# Patient Record
Sex: FEMALE | Race: BLACK OR AFRICAN AMERICAN | NOT HISPANIC OR LATINO | Employment: FULL TIME | URBAN - METROPOLITAN AREA
[De-identification: names, ages, dates, MRNs, and addresses within clinical notes are randomized per-mention and may not be internally consistent; named-entity substitution may affect disease eponyms.]

---

## 2024-01-30 ENCOUNTER — OFFICE VISIT (OUTPATIENT)
Dept: PRIMARY CARE | Facility: CLINIC | Age: 44
End: 2024-01-30
Payer: COMMERCIAL

## 2024-01-30 VITALS
WEIGHT: 143 LBS | SYSTOLIC BLOOD PRESSURE: 125 MMHG | HEIGHT: 63 IN | TEMPERATURE: 98 F | DIASTOLIC BLOOD PRESSURE: 85 MMHG | OXYGEN SATURATION: 98 % | BODY MASS INDEX: 25.34 KG/M2 | RESPIRATION RATE: 18 BRPM | HEART RATE: 65 BPM

## 2024-01-30 DIAGNOSIS — Z11.4 SCREENING FOR HIV (HUMAN IMMUNODEFICIENCY VIRUS): ICD-10-CM

## 2024-01-30 DIAGNOSIS — Z83.49 FAMILY HISTORY OF THYROID DISEASE: ICD-10-CM

## 2024-01-30 DIAGNOSIS — Z11.59 NEED FOR HEPATITIS C SCREENING TEST: ICD-10-CM

## 2024-01-30 DIAGNOSIS — Z00.00 HEALTHCARE MAINTENANCE: Primary | ICD-10-CM

## 2024-01-30 PROCEDURE — 87389 HIV-1 AG W/HIV-1&-2 AB AG IA: CPT

## 2024-01-30 PROCEDURE — 83036 HEMOGLOBIN GLYCOSYLATED A1C: CPT

## 2024-01-30 PROCEDURE — 80053 COMPREHEN METABOLIC PANEL: CPT

## 2024-01-30 PROCEDURE — 99396 PREV VISIT EST AGE 40-64: CPT | Performed by: INTERNAL MEDICINE

## 2024-01-30 PROCEDURE — 84443 ASSAY THYROID STIM HORMONE: CPT

## 2024-01-30 PROCEDURE — 86803 HEPATITIS C AB TEST: CPT

## 2024-01-30 PROCEDURE — 36415 COLL VENOUS BLD VENIPUNCTURE: CPT

## 2024-01-30 PROCEDURE — 1036F TOBACCO NON-USER: CPT | Performed by: INTERNAL MEDICINE

## 2024-01-30 SDOH — HEALTH STABILITY: PHYSICAL HEALTH: ON AVERAGE, HOW MANY DAYS PER WEEK DO YOU ENGAGE IN MODERATE TO STRENUOUS EXERCISE (LIKE A BRISK WALK)?: 5 DAYS

## 2024-01-30 SDOH — HEALTH STABILITY: PHYSICAL HEALTH: ON AVERAGE, HOW MANY MINUTES DO YOU ENGAGE IN EXERCISE AT THIS LEVEL?: 50 MIN

## 2024-01-30 SDOH — ECONOMIC STABILITY: TRANSPORTATION INSECURITY
IN THE PAST 12 MONTHS, HAS LACK OF TRANSPORTATION KEPT YOU FROM MEETINGS, WORK, OR FROM GETTING THINGS NEEDED FOR DAILY LIVING?: NO

## 2024-01-30 SDOH — ECONOMIC STABILITY: GENERAL
WHICH OF THE FOLLOWING DO YOU KNOW HOW TO USE AND HAVE ACCESS TO EVERY DAY? (CHOOSE ALL THAT APPLY): DESKTOP COMPUTER, LAPTOP COMPUTER, OR TABLET WITH BROADBAND INTERNET CONNECTION;SMARTPHONE WITH CELLULAR DATA PLAN

## 2024-01-30 SDOH — ECONOMIC STABILITY: FOOD INSECURITY: WITHIN THE PAST 12 MONTHS, THE FOOD YOU BOUGHT JUST DIDN'T LAST AND YOU DIDN'T HAVE MONEY TO GET MORE.: NEVER TRUE

## 2024-01-30 SDOH — ECONOMIC STABILITY: HOUSING INSECURITY
IN THE LAST 12 MONTHS, WAS THERE A TIME WHEN YOU DID NOT HAVE A STEADY PLACE TO SLEEP OR SLEPT IN A SHELTER (INCLUDING NOW)?: NO

## 2024-01-30 SDOH — ECONOMIC STABILITY: INCOME INSECURITY: IN THE LAST 12 MONTHS, WAS THERE A TIME WHEN YOU WERE NOT ABLE TO PAY THE MORTGAGE OR RENT ON TIME?: NO

## 2024-01-30 SDOH — ECONOMIC STABILITY: FOOD INSECURITY: WITHIN THE PAST 12 MONTHS, YOU WORRIED THAT YOUR FOOD WOULD RUN OUT BEFORE YOU GOT MONEY TO BUY MORE.: NEVER TRUE

## 2024-01-30 SDOH — ECONOMIC STABILITY: TRANSPORTATION INSECURITY
IN THE PAST 12 MONTHS, HAS THE LACK OF TRANSPORTATION KEPT YOU FROM MEDICAL APPOINTMENTS OR FROM GETTING MEDICATIONS?: NO

## 2024-01-30 ASSESSMENT — ANXIETY QUESTIONNAIRES
1. FEELING NERVOUS, ANXIOUS, OR ON EDGE: NOT AT ALL
7. FEELING AFRAID AS IF SOMETHING AWFUL MIGHT HAPPEN: NOT AT ALL
5. BEING SO RESTLESS THAT IT IS HARD TO SIT STILL: NOT AT ALL
3. WORRYING TOO MUCH ABOUT DIFFERENT THINGS: NOT AT ALL
6. BECOMING EASILY ANNOYED OR IRRITABLE: NOT AT ALL
4. TROUBLE RELAXING: NOT AT ALL
GAD7 TOTAL SCORE: 0
2. NOT BEING ABLE TO STOP OR CONTROL WORRYING: NOT AT ALL
IF YOU CHECKED OFF ANY PROBLEMS ON THIS QUESTIONNAIRE, HOW DIFFICULT HAVE THESE PROBLEMS MADE IT FOR YOU TO DO YOUR WORK, TAKE CARE OF THINGS AT HOME, OR GET ALONG WITH OTHER PEOPLE: NOT DIFFICULT AT ALL

## 2024-01-30 ASSESSMENT — LIFESTYLE VARIABLES
AUDIT-C TOTAL SCORE: 0
SKIP TO QUESTIONS 9-10: 1
HOW MANY STANDARD DRINKS CONTAINING ALCOHOL DO YOU HAVE ON A TYPICAL DAY: 1 OR 2
HOW OFTEN DO YOU HAVE A DRINK CONTAINING ALCOHOL: NEVER
HOW OFTEN DO YOU HAVE SIX OR MORE DRINKS ON ONE OCCASION: NEVER

## 2024-01-30 ASSESSMENT — ENCOUNTER SYMPTOMS
LOSS OF SENSATION IN FEET: 0
OCCASIONAL FEELINGS OF UNSTEADINESS: 0
DEPRESSION: 0

## 2024-01-30 ASSESSMENT — SOCIAL DETERMINANTS OF HEALTH (SDOH)
HOW OFTEN DO YOU GET TOGETHER WITH FRIENDS OR RELATIVES?: MORE THAN THREE TIMES A WEEK
DO YOU BELONG TO ANY CLUBS OR ORGANIZATIONS SUCH AS CHURCH GROUPS UNIONS, FRATERNAL OR ATHLETIC GROUPS, OR SCHOOL GROUPS?: PATIENT DECLINED
IN THE PAST 12 MONTHS, HAS THE ELECTRIC, GAS, OIL, OR WATER COMPANY THREATENED TO SHUT OFF SERVICE IN YOUR HOME?: NO
HOW OFTEN DO YOU ATTENT MEETINGS OF THE CLUB OR ORGANIZATION YOU BELONG TO?: PATIENT DECLINED
WITHIN THE LAST YEAR, HAVE YOU BEEN AFRAID OF YOUR PARTNER OR EX-PARTNER?: NO
HOW OFTEN DO YOU ATTEND CHURCH OR RELIGIOUS SERVICES?: MORE THAN 4 TIMES PER YEAR
IN A TYPICAL WEEK, HOW MANY TIMES DO YOU TALK ON THE PHONE WITH FAMILY, FRIENDS, OR NEIGHBORS?: MORE THAN THREE TIMES A WEEK
WITHIN THE LAST YEAR, HAVE YOU BEEN HUMILIATED OR EMOTIONALLY ABUSED IN OTHER WAYS BY YOUR PARTNER OR EX-PARTNER?: NO
WITHIN THE LAST YEAR, HAVE YOU BEEN KICKED, HIT, SLAPPED, OR OTHERWISE PHYSICALLY HURT BY YOUR PARTNER OR EX-PARTNER?: NO
HOW HARD IS IT FOR YOU TO PAY FOR THE VERY BASICS LIKE FOOD, HOUSING, MEDICAL CARE, AND HEATING?: NOT HARD AT ALL
ARE YOU MARRIED, WIDOWED, DIVORCED, SEPARATED, NEVER MARRIED, OR LIVING WITH A PARTNER?: PATIENT DECLINED
WITHIN THE LAST YEAR, HAVE TO BEEN RAPED OR FORCED TO HAVE ANY KIND OF SEXUAL ACTIVITY BY YOUR PARTNER OR EX-PARTNER?: NO

## 2024-01-30 ASSESSMENT — COLUMBIA-SUICIDE SEVERITY RATING SCALE - C-SSRS
6. HAVE YOU EVER DONE ANYTHING, STARTED TO DO ANYTHING, OR PREPARED TO DO ANYTHING TO END YOUR LIFE?: NO
2. HAVE YOU ACTUALLY HAD ANY THOUGHTS OF KILLING YOURSELF?: NO
1. IN THE PAST MONTH, HAVE YOU WISHED YOU WERE DEAD OR WISHED YOU COULD GO TO SLEEP AND NOT WAKE UP?: NO

## 2024-01-30 ASSESSMENT — PATIENT HEALTH QUESTIONNAIRE - PHQ9
SUM OF ALL RESPONSES TO PHQ9 QUESTIONS 1 & 2: 0
1. LITTLE INTEREST OR PLEASURE IN DOING THINGS: NOT AT ALL
2. FEELING DOWN, DEPRESSED OR HOPELESS: NOT AT ALL

## 2024-01-30 ASSESSMENT — PAIN SCALES - GENERAL: PAINLEVEL: 0-NO PAIN

## 2024-01-30 NOTE — PROGRESS NOTES
Primary care office Note- Dr. Nash Devi      Name: Kaya Buchanan, Age: 43 y.o., Gender: female, MRN: [id number]   Pharmacy:   [facility]/pharmacy #[id number] - [place] - [address] [place] [address]  Phone: [phone] Fax: [phone]     PCP: Nash Devi        Subjective:     Chief Complaint   Patient presents with    Annual Exam       HPI:   Kaya Buchanan is a 43 y.o. female that presents for annual. She feels good overall.      ROS  Review of Systems   Constitutional:  Negative for activity change and chills.   HENT:  Negative for congestion, sinus pressure and sore throat.    Respiratory:  Negative for chest tightness and shortness of breath.    Cardiovascular:  Negative for chest pain and palpitations.   Gastrointestinal:  Negative for diarrhea and nausea.   Musculoskeletal:  Negative for myalgias.   Neurological:  Negative for weakness.   Psychiatric/Behavioral:  Negative for agitation and behavioral problems.         Medical History      PMH:    has a past medical history of HELLP syndrome (HELLP), unspecified trimester (11/15/2016) and Personal history of diseases of the skin and subcutaneous tissue (02/10/2015).   Allergies:   No Known Allergies   Surgical Hx:   Past Surgical History:   Procedure Laterality Date    HERNIA REPAIR  02/10/2015    Inguinal Hernia Repair      Social HX:   Social History     Tobacco Use    Smoking status: Never    Smokeless tobacco: Never   Vaping Use    Vaping Use: Never used   Substance Use Topics    Alcohol use: Yes     Alcohol/week: 4.0 standard drinks of alcohol     Types: 4 Glasses of wine per week    Drug use: Never        MEDS:   No current outpatient medications     Objective Data     Objective:   Visit Vitals  /85 (BP Location: Left arm, Patient Position: Sitting, BP Cuff Size: Large adult)   Pulse 65   Temp 36.7 °C (98 °F) (Temporal)   Resp 18        Physical Examination:   Physical Exam  Constitutional:        "Appearance: Normal appearance.   HENT:      Head: Normocephalic and atraumatic.      Nose: Nose normal.      Mouth/Throat:      Mouth: Mucous membranes are moist.   Eyes:      Extraocular Movements: Extraocular movements intact.      Pupils: Pupils are equal, round, and reactive to light.   Cardiovascular:      Rate and Rhythm: Normal rate and regular rhythm.   Pulmonary:      Effort: No respiratory distress.      Breath sounds: No wheezing.   Abdominal:      General: Bowel sounds are normal.      Palpations: Abdomen is soft.   Neurological:      General: No focal deficit present.   Psychiatric:         Mood and Affect: Mood normal.          Last Labs:   CBC:   WBC   Date Value Ref Range Status   01/31/2023 4.0 (L) 4.4 - 11.3 x10E9/L Final   01/31/2022 4.6 4.4 - 11.3 x10E9/L Final     Hemoglobin   Date Value Ref Range Status   01/31/2023 13.3 12.0 - 16.0 g/dL Final   01/31/2022 14.4 12.0 - 16.0 g/dL Final     MCV   Date Value Ref Range Status   01/31/2023 91 80 - 100 fL Final   01/31/2022 91 80 - 100 fL Final     Platelets   Date Value Ref Range Status   01/31/2023 153 150 - 450 x10E9/L Final   01/31/2022 145 (L) 150 - 450 x10E9/L Final      CMP:   Sodium   Date Value Ref Range Status   01/31/2023 138 136 - 145 mmol/L Final   01/31/2022 139 136 - 145 mmol/L Final     Potassium   Date Value Ref Range Status   01/31/2023 4.1 3.5 - 5.3 mmol/L Final   01/31/2022 4.3 3.5 - 5.3 mmol/L Final     Chloride   Date Value Ref Range Status   01/31/2023 102 98 - 107 mmol/L Final   01/31/2022 103 98 - 107 mmol/L Final     Urea Nitrogen   Date Value Ref Range Status   01/31/2023 15 6 - 23 mg/dL Final   01/31/2022 15 6 - 23 mg/dL Final     Creatinine   Date Value Ref Range Status   01/31/2023 0.90 0.50 - 1.05 mg/dL Final   01/31/2022 0.86 0.50 - 1.05 mg/dL Final      A1c:   No results found for: \"HGBA1C\"     Other labs;   Vit D:   No results found for: \"VITD25\"   TSH:  TSH   Date Value Ref Range Status   01/31/2023 0.52 0.44 - 3.98 " mIU/L Final     Comment:      TSH testing is performed using different testing    methodology at [facility] than at other    [facility]. Direct result comparisons should    only be made within the same method.     01/31/2022 0.53 0.44 - 3.98 mIU/L Final     Comment:      TSH testing is performed using different testing    methodology at [facility] than at other    [facility]. Direct result comparisons should    only be made within the same method.          Assessment and Plan     Problem List Items Addressed This Visit       Healthcare maintenance - Primary    Relevant Orders    Comprehensive Metabolic Panel    Hemoglobin A1C     Other Visit Diagnoses       Screening for HIV (human immunodeficiency virus)        Relevant Orders    HIV 1/2 Antigen/Antibody Screen with Reflex to Confirmation    Family history of thyroid disease        Relevant Orders    Thyroid Stimulating Hormone    Need for hepatitis C screening test        Relevant Orders    Hepatitis C Antibody         She had all of the Hep B shots. She had the flu shot at work on Oct 30th 2023. Her OB orders the mammograms etc.     Nash Devi MD

## 2024-01-31 LAB
ALBUMIN SERPL BCP-MCNC: 4.1 G/DL (ref 3.4–5)
ALP SERPL-CCNC: 40 U/L (ref 33–110)
ALT SERPL W P-5'-P-CCNC: 13 U/L (ref 7–45)
ANION GAP SERPL CALC-SCNC: 14 MMOL/L (ref 10–20)
AST SERPL W P-5'-P-CCNC: 17 U/L (ref 9–39)
BILIRUB SERPL-MCNC: 1.1 MG/DL (ref 0–1.2)
BUN SERPL-MCNC: 19 MG/DL (ref 6–23)
CALCIUM SERPL-MCNC: 9.6 MG/DL (ref 8.6–10.6)
CHLORIDE SERPL-SCNC: 103 MMOL/L (ref 98–107)
CO2 SERPL-SCNC: 27 MMOL/L (ref 21–32)
CREAT SERPL-MCNC: 0.85 MG/DL (ref 0.5–1.05)
EGFRCR SERPLBLD CKD-EPI 2021: 88 ML/MIN/1.73M*2
EST. AVERAGE GLUCOSE BLD GHB EST-MCNC: 105 MG/DL
GLUCOSE SERPL-MCNC: 79 MG/DL (ref 74–99)
HBA1C MFR BLD: 5.3 %
HCV AB SER QL: NONREACTIVE
HIV 1+2 AB+HIV1 P24 AG SERPL QL IA: NONREACTIVE
POTASSIUM SERPL-SCNC: 4.5 MMOL/L (ref 3.5–5.3)
PROT SERPL-MCNC: 7 G/DL (ref 6.4–8.2)
SODIUM SERPL-SCNC: 139 MMOL/L (ref 136–145)
TSH SERPL-ACNC: 0.7 MIU/L (ref 0.44–3.98)

## 2024-01-31 ASSESSMENT — ENCOUNTER SYMPTOMS
ACTIVITY CHANGE: 0
CHILLS: 0
PALPITATIONS: 0
WEAKNESS: 0
DIARRHEA: 0
NAUSEA: 0
SINUS PRESSURE: 0
SHORTNESS OF BREATH: 0
AGITATION: 0
SORE THROAT: 0
MYALGIAS: 0
CHEST TIGHTNESS: 0

## 2024-04-25 ENCOUNTER — APPOINTMENT (OUTPATIENT)
Dept: OBSTETRICS AND GYNECOLOGY | Facility: CLINIC | Age: 44
End: 2024-04-25
Payer: COMMERCIAL

## 2024-06-19 ENCOUNTER — HOSPITAL ENCOUNTER (INPATIENT)
Facility: HOSPITAL | Age: 44
DRG: 060 | End: 2024-06-19
Attending: EMERGENCY MEDICINE | Admitting: INTERNAL MEDICINE
Payer: COMMERCIAL

## 2024-06-19 DIAGNOSIS — R29.898 WEAKNESS OF BOTH HANDS: ICD-10-CM

## 2024-06-19 DIAGNOSIS — R29.898 RIGHT ARM WEAKNESS: Primary | ICD-10-CM

## 2024-06-19 PROCEDURE — 96361 HYDRATE IV INFUSION ADD-ON: CPT

## 2024-06-19 PROCEDURE — 99285 EMERGENCY DEPT VISIT HI MDM: CPT | Mod: 25

## 2024-06-19 PROCEDURE — 2500000004 HC RX 250 GENERAL PHARMACY W/ HCPCS (ALT 636 FOR OP/ED): Performed by: EMERGENCY MEDICINE

## 2024-06-19 PROCEDURE — 96360 HYDRATION IV INFUSION INIT: CPT

## 2024-06-19 ASSESSMENT — PAIN DESCRIPTION - PROGRESSION: CLINICAL_PROGRESSION: NOT CHANGED

## 2024-06-19 ASSESSMENT — PAIN DESCRIPTION - PAIN TYPE: TYPE: ACUTE PAIN

## 2024-06-19 ASSESSMENT — PAIN - FUNCTIONAL ASSESSMENT: PAIN_FUNCTIONAL_ASSESSMENT: 0-10

## 2024-06-19 ASSESSMENT — PAIN DESCRIPTION - FREQUENCY: FREQUENCY: CONSTANT/CONTINUOUS

## 2024-06-19 ASSESSMENT — PAIN SCALES - GENERAL
PAINLEVEL_OUTOF10: 4
PAINLEVEL_OUTOF10: 0 - NO PAIN

## 2024-06-19 ASSESSMENT — PAIN DESCRIPTION - DESCRIPTORS: DESCRIPTORS: THROBBING

## 2024-06-19 ASSESSMENT — PAIN DESCRIPTION - DIRECTION: RADIATING_TOWARDS: NON-RADIATING

## 2024-06-19 ASSESSMENT — PAIN DESCRIPTION - LOCATION: LOCATION: ARM

## 2024-06-19 ASSESSMENT — PAIN DESCRIPTION - ORIENTATION: ORIENTATION: LEFT;RIGHT

## 2024-06-19 ASSESSMENT — PAIN DESCRIPTION - ONSET: ONSET: ONGOING

## 2024-06-20 ENCOUNTER — APPOINTMENT (OUTPATIENT)
Dept: RADIOLOGY | Facility: HOSPITAL | Age: 44
DRG: 060 | End: 2024-06-20
Payer: COMMERCIAL

## 2024-06-20 ENCOUNTER — APPOINTMENT (OUTPATIENT)
Dept: CARDIOLOGY | Facility: HOSPITAL | Age: 44
DRG: 060 | End: 2024-06-20
Payer: COMMERCIAL

## 2024-06-20 PROBLEM — R29.898 WEAKNESS OF BOTH HANDS: Status: ACTIVE | Noted: 2024-06-20

## 2024-06-20 PROBLEM — R53.1 WEAKNESS: Status: ACTIVE | Noted: 2024-06-20

## 2024-06-20 LAB
ALBUMIN SERPL BCP-MCNC: 4.3 G/DL (ref 3.4–5)
ALP SERPL-CCNC: 58 U/L (ref 33–110)
ALT SERPL W P-5'-P-CCNC: 12 U/L (ref 7–45)
ANION GAP SERPL CALC-SCNC: 17 MMOL/L (ref 10–20)
AST SERPL W P-5'-P-CCNC: 19 U/L (ref 9–39)
ATRIAL RATE: 78 BPM
B-HCG SERPL-ACNC: <2 MIU/ML
BASOPHILS # BLD AUTO: 0.03 X10*3/UL (ref 0–0.1)
BASOPHILS NFR BLD AUTO: 0.5 %
BILIRUB SERPL-MCNC: 0.7 MG/DL (ref 0–1.2)
BUN SERPL-MCNC: 18 MG/DL (ref 6–23)
CALCIUM SERPL-MCNC: 9.8 MG/DL (ref 8.6–10.3)
CHLORIDE SERPL-SCNC: 102 MMOL/L (ref 98–107)
CK SERPL-CCNC: 146 U/L (ref 0–215)
CO2 SERPL-SCNC: 25 MMOL/L (ref 21–32)
CREAT SERPL-MCNC: 0.84 MG/DL (ref 0.5–1.05)
EGFRCR SERPLBLD CKD-EPI 2021: 90 ML/MIN/1.73M*2
EOSINOPHIL # BLD AUTO: 0.08 X10*3/UL (ref 0–0.7)
EOSINOPHIL NFR BLD AUTO: 1.5 %
ERYTHROCYTE [DISTWIDTH] IN BLOOD BY AUTOMATED COUNT: 13 % (ref 11.5–14.5)
GLUCOSE SERPL-MCNC: 99 MG/DL (ref 74–99)
HCT VFR BLD AUTO: 45.3 % (ref 36–46)
HGB BLD-MCNC: 14.5 G/DL (ref 12–16)
IMM GRANULOCYTES # BLD AUTO: 0.01 X10*3/UL (ref 0–0.7)
IMM GRANULOCYTES NFR BLD AUTO: 0.2 % (ref 0–0.9)
LYMPHOCYTES # BLD AUTO: 1.12 X10*3/UL (ref 1.2–4.8)
LYMPHOCYTES NFR BLD AUTO: 20.4 %
MAGNESIUM SERPL-MCNC: 1.9 MG/DL (ref 1.6–2.4)
MCH RBC QN AUTO: 28.2 PG (ref 26–34)
MCHC RBC AUTO-ENTMCNC: 32 G/DL (ref 32–36)
MCV RBC AUTO: 88 FL (ref 80–100)
MONOCYTES # BLD AUTO: 0.41 X10*3/UL (ref 0.1–1)
MONOCYTES NFR BLD AUTO: 7.5 %
NEUTROPHILS # BLD AUTO: 3.84 X10*3/UL (ref 1.2–7.7)
NEUTROPHILS NFR BLD AUTO: 69.9 %
NRBC BLD-RTO: 0 /100 WBCS (ref 0–0)
P AXIS: 74 DEGREES
P OFFSET: 198 MS
P ONSET: 146 MS
PLATELET # BLD AUTO: 160 X10*3/UL (ref 150–450)
POTASSIUM SERPL-SCNC: 3.8 MMOL/L (ref 3.5–5.3)
PR INTERVAL: 148 MS
PROT SERPL-MCNC: 7.7 G/DL (ref 6.4–8.2)
Q ONSET: 220 MS
QRS COUNT: 13 BEATS
QRS DURATION: 82 MS
QT INTERVAL: 388 MS
QTC CALCULATION(BAZETT): 442 MS
QTC FREDERICIA: 423 MS
R AXIS: 73 DEGREES
RBC # BLD AUTO: 5.15 X10*6/UL (ref 4–5.2)
SODIUM SERPL-SCNC: 140 MMOL/L (ref 136–145)
T AXIS: 98 DEGREES
T OFFSET: 414 MS
VENTRICULAR RATE: 78 BPM
WBC # BLD AUTO: 5.5 X10*3/UL (ref 4.4–11.3)

## 2024-06-20 PROCEDURE — G0378 HOSPITAL OBSERVATION PER HR: HCPCS

## 2024-06-20 PROCEDURE — 99222 1ST HOSP IP/OBS MODERATE 55: CPT | Performed by: INTERNAL MEDICINE

## 2024-06-20 PROCEDURE — 84702 CHORIONIC GONADOTROPIN TEST: CPT | Performed by: NURSE PRACTITIONER

## 2024-06-20 PROCEDURE — 36415 COLL VENOUS BLD VENIPUNCTURE: CPT | Performed by: EMERGENCY MEDICINE

## 2024-06-20 PROCEDURE — 82525 ASSAY OF COPPER: CPT | Performed by: PSYCHIATRY & NEUROLOGY

## 2024-06-20 PROCEDURE — 93005 ELECTROCARDIOGRAM TRACING: CPT

## 2024-06-20 PROCEDURE — 99222 1ST HOSP IP/OBS MODERATE 55: CPT | Performed by: PSYCHIATRY & NEUROLOGY

## 2024-06-20 PROCEDURE — 70450 CT HEAD/BRAIN W/O DYE: CPT | Performed by: RADIOLOGY

## 2024-06-20 PROCEDURE — 36415 COLL VENOUS BLD VENIPUNCTURE: CPT | Performed by: PSYCHIATRY & NEUROLOGY

## 2024-06-20 PROCEDURE — 85025 COMPLETE CBC W/AUTO DIFF WBC: CPT | Performed by: EMERGENCY MEDICINE

## 2024-06-20 PROCEDURE — 72156 MRI NECK SPINE W/O & W/DYE: CPT

## 2024-06-20 PROCEDURE — 70450 CT HEAD/BRAIN W/O DYE: CPT

## 2024-06-20 PROCEDURE — A9575 INJ GADOTERATE MEGLUMI 0.1ML: HCPCS | Performed by: INTERNAL MEDICINE

## 2024-06-20 PROCEDURE — 80053 COMPREHEN METABOLIC PANEL: CPT | Performed by: EMERGENCY MEDICINE

## 2024-06-20 PROCEDURE — 82550 ASSAY OF CK (CPK): CPT | Performed by: EMERGENCY MEDICINE

## 2024-06-20 PROCEDURE — 82607 VITAMIN B-12: CPT | Mod: AHULAB | Performed by: PSYCHIATRY & NEUROLOGY

## 2024-06-20 PROCEDURE — 2550000001 HC RX 255 CONTRASTS: Performed by: INTERNAL MEDICINE

## 2024-06-20 PROCEDURE — 83735 ASSAY OF MAGNESIUM: CPT | Performed by: EMERGENCY MEDICINE

## 2024-06-20 RX ORDER — ACETAMINOPHEN 650 MG/1
650 SUPPOSITORY RECTAL EVERY 4 HOURS PRN
Status: DISCONTINUED | OUTPATIENT
Start: 2024-06-20 | End: 2024-06-24 | Stop reason: HOSPADM

## 2024-06-20 RX ORDER — ONDANSETRON HYDROCHLORIDE 2 MG/ML
4 INJECTION, SOLUTION INTRAVENOUS EVERY 8 HOURS PRN
Status: DISCONTINUED | OUTPATIENT
Start: 2024-06-20 | End: 2024-06-24 | Stop reason: HOSPADM

## 2024-06-20 RX ORDER — ONDANSETRON 4 MG/1
4 TABLET, FILM COATED ORAL EVERY 8 HOURS PRN
Status: DISCONTINUED | OUTPATIENT
Start: 2024-06-20 | End: 2024-06-24 | Stop reason: HOSPADM

## 2024-06-20 RX ORDER — BIOTIN 5 MG
1 TABLET ORAL DAILY
COMMUNITY

## 2024-06-20 RX ORDER — IBUPROFEN 200 MG
2-3 TABLET ORAL EVERY 6 HOURS PRN
COMMUNITY

## 2024-06-20 RX ORDER — LORATADINE 10 MG/1
10 TABLET ORAL DAILY PRN
COMMUNITY

## 2024-06-20 RX ORDER — ACETAMINOPHEN 160 MG/5ML
650 SOLUTION ORAL EVERY 4 HOURS PRN
Status: DISCONTINUED | OUTPATIENT
Start: 2024-06-20 | End: 2024-06-24 | Stop reason: HOSPADM

## 2024-06-20 RX ORDER — ACETAMINOPHEN 325 MG/1
650 TABLET ORAL EVERY 4 HOURS PRN
Status: DISCONTINUED | OUTPATIENT
Start: 2024-06-20 | End: 2024-06-24 | Stop reason: HOSPADM

## 2024-06-20 RX ORDER — GADOTERATE MEGLUMINE 376.9 MG/ML
13 INJECTION INTRAVENOUS
Status: COMPLETED | OUTPATIENT
Start: 2024-06-20 | End: 2024-06-20

## 2024-06-20 SDOH — SOCIAL STABILITY: SOCIAL INSECURITY: DO YOU FEEL UNSAFE GOING BACK TO THE PLACE WHERE YOU ARE LIVING?: NO

## 2024-06-20 SDOH — SOCIAL STABILITY: SOCIAL INSECURITY: DO YOU FEEL ANYONE HAS EXPLOITED OR TAKEN ADVANTAGE OF YOU FINANCIALLY OR OF YOUR PERSONAL PROPERTY?: NO

## 2024-06-20 SDOH — SOCIAL STABILITY: SOCIAL INSECURITY: HAVE YOU HAD THOUGHTS OF HARMING ANYONE ELSE?: NO

## 2024-06-20 SDOH — SOCIAL STABILITY: SOCIAL INSECURITY: ABUSE: ADULT

## 2024-06-20 SDOH — SOCIAL STABILITY: SOCIAL INSECURITY: ARE THERE ANY APPARENT SIGNS OF INJURIES/BEHAVIORS THAT COULD BE RELATED TO ABUSE/NEGLECT?: NO

## 2024-06-20 SDOH — SOCIAL STABILITY: SOCIAL INSECURITY: ARE YOU OR HAVE YOU BEEN THREATENED OR ABUSED PHYSICALLY, EMOTIONALLY, OR SEXUALLY BY ANYONE?: NO

## 2024-06-20 SDOH — SOCIAL STABILITY: SOCIAL INSECURITY: HAS ANYONE EVER THREATENED TO HURT YOUR FAMILY OR YOUR PETS?: NO

## 2024-06-20 SDOH — SOCIAL STABILITY: SOCIAL INSECURITY: DOES ANYONE TRY TO KEEP YOU FROM HAVING/CONTACTING OTHER FRIENDS OR DOING THINGS OUTSIDE YOUR HOME?: NO

## 2024-06-20 SDOH — SOCIAL STABILITY: SOCIAL INSECURITY: WERE YOU ABLE TO COMPLETE ALL THE BEHAVIORAL HEALTH SCREENINGS?: YES

## 2024-06-20 SDOH — SOCIAL STABILITY: SOCIAL INSECURITY: HAVE YOU HAD ANY THOUGHTS OF HARMING ANYONE ELSE?: NO

## 2024-06-20 ASSESSMENT — ACTIVITIES OF DAILY LIVING (ADL)
HEARING - RIGHT EAR: FUNCTIONAL
TOILETING: INDEPENDENT
PATIENT'S MEMORY ADEQUATE TO SAFELY COMPLETE DAILY ACTIVITIES?: YES
FEEDING YOURSELF: INDEPENDENT
LACK_OF_TRANSPORTATION: NO
BATHING: INDEPENDENT
HEARING - LEFT EAR: FUNCTIONAL
WALKS IN HOME: INDEPENDENT
ADEQUATE_TO_COMPLETE_ADL: YES
DRESSING YOURSELF: INDEPENDENT
GROOMING: INDEPENDENT
JUDGMENT_ADEQUATE_SAFELY_COMPLETE_DAILY_ACTIVITIES: YES

## 2024-06-20 ASSESSMENT — COGNITIVE AND FUNCTIONAL STATUS - GENERAL
DAILY ACTIVITIY SCORE: 24
PATIENT BASELINE BEDBOUND: NO
MOBILITY SCORE: 24

## 2024-06-20 ASSESSMENT — LIFESTYLE VARIABLES
HOW OFTEN DO YOU HAVE A DRINK CONTAINING ALCOHOL: 2-4 TIMES A MONTH
SKIP TO QUESTIONS 9-10: 1
SUBSTANCE_ABUSE_PAST_12_MONTHS: NO
PRESCIPTION_ABUSE_PAST_12_MONTHS: NO
HOW MANY STANDARD DRINKS CONTAINING ALCOHOL DO YOU HAVE ON A TYPICAL DAY: 1 OR 2
AUDIT-C TOTAL SCORE: 2
HOW OFTEN DO YOU HAVE 6 OR MORE DRINKS ON ONE OCCASION: NEVER
AUDIT-C TOTAL SCORE: 2

## 2024-06-20 ASSESSMENT — PATIENT HEALTH QUESTIONNAIRE - PHQ9
2. FEELING DOWN, DEPRESSED OR HOPELESS: NOT AT ALL
SUM OF ALL RESPONSES TO PHQ9 QUESTIONS 1 & 2: 0
1. LITTLE INTEREST OR PLEASURE IN DOING THINGS: NOT AT ALL

## 2024-06-20 ASSESSMENT — ENCOUNTER SYMPTOMS
WEAKNESS: 1
TREMORS: 1
RESPIRATORY NEGATIVE: 1
PSYCHIATRIC NEGATIVE: 1
GASTROINTESTINAL NEGATIVE: 1
MUSCULOSKELETAL NEGATIVE: 1
CARDIOVASCULAR NEGATIVE: 1
CONSTITUTIONAL NEGATIVE: 1

## 2024-06-20 ASSESSMENT — PAIN SCALES - GENERAL
PAINLEVEL_OUTOF10: 0 - NO PAIN
PAINLEVEL_OUTOF10: 2
PAINLEVEL_OUTOF10: 0 - NO PAIN
PAINLEVEL_OUTOF10: 0 - NO PAIN

## 2024-06-20 ASSESSMENT — PAIN DESCRIPTION - ORIENTATION: ORIENTATION: LEFT;OTHER (COMMENT)

## 2024-06-20 ASSESSMENT — PAIN - FUNCTIONAL ASSESSMENT: PAIN_FUNCTIONAL_ASSESSMENT: 0-10

## 2024-06-20 NOTE — CONSULTS
Inpatient consult to Neurology  Consult performed by: Vin Yusuf MD  Consult ordered by: LINH Contreras-CNP          History Of Present Illness  Kaya Buchanan is a 43 y.o. right handed female presenting with bilateral upper extremity symptoms.    She has past medical history significant for right upper extremity dysfunction which she indicates developed while she was nursing her daughter 7 years ago and which was worked up at [facility], with residual symptoms including the right hand being weaker than the left and a mild postural tremor of the right hand.  Otherwise history of HELLP syndrome, hernia repair, alopecia.    She indicates onset of right hand numbness (without tingling) and weakness greater than baseline at roughly 5:45 PM yesterday.  No obvious inciting trauma, recent symptoms of illness, or other inciting factor.  Despite the impression of the right hand being weaker than baseline and numb, she proceeded with her workout which she describes as being relatively vigorous.  After her workout she noted throbbing discomfort in both upper arms and numbness of both hands, lasting for several hours.    She reports that present the left upper extremity feels essentially back to baseline.  The right upper extremity also feels significantly improved although not as much so as the left and possibly not back to her baseline.    She denies neck pain or radicular pain from the neck down the arms.    She denies lower extremity symptoms or gait instability.    She denies visual complaints or past history of episodes to suggest optic neuritis.    She denies bladder or bowel dysfunction.    I reviewed a noncontrast head CT which shows an ovoid hypodense focus in the right mesial temporal region suggesting a giant perivascular space but with no prior studies available on PACS for comparison.    Labs show normal serum CK of 146.  Normal CMP.  Negative hCG.    She indicates undergoing evaluations at [facility] 7  years ago.  Imaging is not available for direct review but cervical spine MRI at that time (2017) is reported as showing mild to moderate bilateral foraminal stenosis at C4/5 with milder findings at other cervical levels, normal cord appearance.  Brachial plexus MRI is reported as normal.  Brain MRI reported as unremarkable.  EMG reported as consistent with chronic right C8/T1 greater than C7 radiculopathy, without definite evidence of brachial plexopathy.    Past Medical History  Past Medical History:   Diagnosis Date    HELLP syndrome (HELLP), unspecified trimester ([facility]) 11/15/2016    HELLP (hemolytic anemia/elev liver enzymes/low platelets in pregnancy)    Personal history of diseases of the skin and subcutaneous tissue 02/10/2015    History of alopecia     Surgical History  Past Surgical History:   Procedure Laterality Date    HERNIA REPAIR  02/10/2015    Inguinal Hernia Repair     Social History  Social History     Tobacco Use    Smoking status: Never    Smokeless tobacco: Never   Vaping Use    Vaping status: Never Used   Substance Use Topics    Alcohol use: Yes     Alcohol/week: 4.0 standard drinks of alcohol     Types: 4 Glasses of wine per week    Drug use: Never     Allergies  Patient has no known allergies.  Medications Prior to Admission   Medication Sig Dispense Refill Last Dose    biotin 5 mg tablet Take 1 tablet (5 mg) by mouth once daily.   Past Week    loratadine (Claritin) 10 mg tablet Take 1 tablet (10 mg) by mouth once daily as needed for allergies.   Past Week    norgestimate-ethinyl estradioL (Ortho Tri-Cyclen,Trinessa) 0.18/0.215/0.25 mg-35 mcg (28) tablet TAKE 1 TABLET BY MOUTH EVERY DAY AS DIRECTED 84 tablet 2 6/19/2024    ibuprofen 200 mg tablet Take 2-3 tablets (400-600 mg) by mouth every 6 hours if needed for mild pain (1 - 3) or headaches.          Review of Systems    Review of Systems:  Neurologic:  As per the history of present illness.  Constitutional:  Negative for fevers or  chills.  Musculoskeletal:  Negative for neck pain or back pain. Cardiovascular:  Negative for chest pain.  Respiratory:  Negative for dyspnea.  Eyes:  Negative for vision loss or diplopia.  ENT:  Negative for hearing loss or tinnitus.  GI:  Negative for bowel incontinence.  :  Negative for bladder incontinence.  Dermatologic:  Negative for rash.        Neurological Exam  Physical Exam    Physical Examination:    General: Alert, sitting up in observation unit bed in no acute distress.    Cardiovascular: Symmetrically warm hands without discoloration or edema.    Mental Status: Clear sensorium without fluctuation.  Appropriate in conversation.  Oriented to self, place and date.  Fluent unremarkable speech without paraphasic errors or hesitancy.    Cranial Nerves:  Funduscopic exam was not well visualized bilaterally on nondilated exam.  Pupils were equal, round and reactive to light with no relative afferent pupillary defect.  Extraocular movements were intact and conjugate without nystagmus.  No ptosis.  Visual fields were full to confrontation tested binocularly.  Facial sensation was symmetric to pin.  Facial motor function was symmetrically intact.  Hearing was grossly intact.  No dysarthria.  Shoulder shrug was symmetric.  Tongue protrusion was midline.    Motor: Muscle tone was normal throughout.  There was abnormal resting posture of the right hand with hyperextension at the MCP joints and flexion at the interphalangeal joints.  There was atrophy of right hand ulnar intrinsic muscles.  There was no pronator drift or asymmetry of finger taps.  Confrontation strength was 5/5 throughout the left upper extremity with the exception of 4 - ADM, 4 FDI and 4 APB.  Right upper extremity was 5/5 at middle deltoid, biceps, triceps, wrist extension and finger extension.  Right ulnar intrinsics were less than 3, right APB 4, long finger flexors at least 4+.  Lower extremities were 5/5 bilaterally.    Coordination: There  "was a persistent rapid low amplitude postural-kinetic tremor of the right hand.  No intention tremor or dysmetria on finger to finger maneuver.  No rest tremor, myoclonus or dystonic posturing.    Tendon Reflexes: Symmetrically 2+ biceps, trace triceps, 2-3+ brachioradialis, positive Tromner signs.  Patellar was 3+ left and 4+ right.  No ankle clonus.  Neutral plantars.    Sensation: Pin and light touch were symmetric over the hands including dorsal ulnar and radial territories.  Pin was symmetric over lateral forearms.  Romberg sign was absent.    Station: Intact and stable.    Gait: Stable and unremarkable.  Intact tandem.    Other: Lhermitte sign was absent.        Last Recorded Vitals  Blood pressure 129/83, pulse 61, temperature 36.5 °C (97.7 °F), temperature source Temporal, resp. rate 18, height 1.575 m (5' 2\"), weight 63.5 kg (140 lb), SpO2 99%.    Relevant Results                    Noah Coma Scale  Best Eye Response: Spontaneous  Best Verbal Response: Oriented  Best Motor Response: Follows commands  Noah Coma Scale Score: 15                 I have personally reviewed the following imaging results     CT head wo IV contrast    Result Date: 6/20/2024  Interpreted By:  Clemente Knapp, STUDY: CT HEAD WO IV CONTRAST;  6/20/2024 12:25 am   INDICATION: Signs/Symptoms:r hand weakness.   COMPARISON: None   ACCESSION NUMBER(S): [id number]   ORDERING CLINICIAN: ROBIN SULLIVAN   TECHNIQUE: Contiguous axial images of the head were obtained without intravenous contrast.   FINDINGS: BRAIN PARENCHYMA:   The gray white matter differentiation is preserved.  No mass effect or midline shift. Hypodensity in the right basal ganglia may correspond to prominent perivascular space or old lacunar infarct.   HEMORRHAGE:  No evidence of acute intracranial hemorrhage. VENTRICLES AND EXTRA-AXIAL SPACES:  The ventricles are within normal limits in size for brain volume.  No evidence of abnormal extraaxial fluid collection. " EXTRACRANIAL SOFT TISSUES:  Within normal limits. PARANASAL SINUSES/MASTOIDS:  The visualized paranasal sinuses and mastoid air cells are clear and well pneumatized. CALVARIUM:  No evidence of depressed calvarial fracture.   OTHER FINDINGS:  None       No evidence of acute intracranial hemorrhage or mass effect.   Hypodensity in the right basal ganglia may correspond to prominent perivascular space or old lacunar infarct.   MACRO: None   Signed by: Clemente Knapp 6/20/2024 1:04 AM Dictation workstation:   [id number]       Assessment/Plan     While she endorses a history dating back 7 years of right upper extremity deficits that apparently were attributed to chronic radiculopathy (without obvious structural basis on MRI), this presentation was for bilateral upper extremity symptoms.  She has upper motor neuron findings on exam as well.    I recommended proceeding with a contrasted cervical spine MRI.  If that is unrevealing then it will be warranted to repeat an EMG as an outpatient evaluating both upper extremities.    Additionally ordering B12 and copper levels.                Vin Yusuf MD

## 2024-06-20 NOTE — ED TRIAGE NOTES
"Patient presents with weakness to B/L Arms and a \"throbbing\" discomfort to B/L UE/Biceps. Her symptoms began after exercising @ 0645 this morning (6/19).  "

## 2024-06-20 NOTE — PROGRESS NOTES
Home with       06/20/24 3962   Current Planned Discharge Disposition   Current Planned Discharge Disposition Home

## 2024-06-20 NOTE — PROGRESS NOTES
06/20/24 0641   [facility] Disability Status   Are you deaf or do you have serious difficulty hearing? N   Are you blind or do you have serious difficulty seeing, even when wearing glasses? N   Because of a physical, mental, or emotional condition, do you have serious difficulty concentrating, remembering, or making decisions? (5 years old or older) N   Do you have serious difficulty walking or climbing stairs? N   Do you have serious difficulty dressing or bathing? Y  (right arm weakness)   Because of a physical, mental, or emotional condition, do you have serious difficulty doing errands alone such as visiting the doctor? Y

## 2024-06-20 NOTE — PROGRESS NOTES
Pharmacy Medication History Review    Per patient. On current placebo week for BC tablet    Kaya Buchanan is a 43 y.o. female admitted for Weakness. Pharmacy reviewed the patient's [id number] medications and allergies for accuracy.    The list below reflectives the updated PTA list. Please review each medication in order reconciliation for additional clarification and justification.       The list below reflectives the updated allergy list. Please review each documented allergy for additional clarification and justification.  Allergies  Reviewed by Joseph Chapley, EMT on 6/19/2024   No Known Allergies         Below are additional concerns with the patient's PTA list.  Prior to Admission Medications   Prescriptions Last Dose Informant   biotin 5 mg tablet Past Week    Sig: Take 1 tablet (5 mg) by mouth once daily.   ibuprofen 200 mg tablet     Sig: Take 2-3 tablets (400-600 mg) by mouth every 6 hours if needed for mild pain (1 - 3) or headaches.   loratadine (Claritin) 10 mg tablet Past Week    Sig: Take 1 tablet (10 mg) by mouth once daily as needed for allergies.   norgestimate-ethinyl estradioL (Ortho Tri-Cyclen,Trinessa) 0.18/0.215/0.25 mg-35 mcg (28) tablet 6/19/2024    Sig: TAKE 1 TABLET BY MOUTH EVERY DAY AS DIRECTED      Facility-Administered Medications: None        Cheyanne Serrato

## 2024-06-20 NOTE — PROGRESS NOTES
Transitional Care Coordination Progress Note:  Plan per Medical/Surgical team: treatment of arm weakness after working out with IV fluids  Status: Observation  Payor source:[employer] [place]  Discharge disposition: Home with    Potential Barriers: CK normal @ 146  ADOD: 6/20/2024   DEWAYNE Jorge RN, BSN Transitional Care Coordinator ED# [phone]      06/20/24 0642   Discharge Planning   Living Arrangements Spouse/significant other   Support Systems Spouse/significant other   Assistance Needed IV fluids   Type of Residence Private residence   Number of Stairs to Enter Residence 3   Number of Stairs Within Residence 14   Home or Post Acute Services None   Patient expects to be discharged to: Home with    Does the patient need discharge transport arranged? No   Financial Resource Strain   How hard is it for you to pay for the very basics like food, housing, medical care, and heating? Not hard   Housing Stability   In the last 12 months, was there a time when you were not able to pay the mortgage or rent on time? N   In the last 12 months, how many places have you lived? 1   In the last 12 months, was there a time when you did not have a steady place to sleep or slept in a shelter (including now)? N   Transportation Needs   In the past 12 months, has lack of transportation kept you from medical appointments or from getting medications? no   In the past 12 months, has lack of transportation kept you from meetings, work, or from getting things needed for daily living? No

## 2024-06-20 NOTE — CARE PLAN
The patient's goals for the shift include to decrease weakness    The clinical goals for the shift include to decrease weakness      Problem: Pain - Adult  Goal: Verbalizes/displays adequate comfort level or baseline comfort level  Outcome: Progressing     Problem: Safety - Adult  Goal: Free from fall injury  Outcome: Progressing     Problem: Discharge Planning  Goal: Discharge to home or other facility with appropriate resources  Outcome: Progressing     Problem: Chronic Conditions and Co-morbidities  Goal: Patient's chronic conditions and co-morbidity symptoms are monitored and maintained or improved  Outcome: Progressing

## 2024-06-20 NOTE — H&P
"History Of Present Illness  Kaya Buchanan is a 43 y.o. female presenting with B/L arm weakness.  Past Medical History:   Diagnosis Date    HELLP syndrome (HELLP), unspecified trimester ([facility]) 11/15/2016    HELLP (hemolytic anemia/elev liver enzymes/low platelets in pregnancy)    Personal history of diseases of the skin and subcutaneous tissue 02/10/2015    History of alopecia     Yesterday, she was heading to gym when she noticed both hands feeling a bit \"weak.\" She went through her exercise routine with no issues. Afterward, she was showering and noticed that her B/L hand weakness (dexterity?) was worse, and she felt pressure sensation in both biceps. Her right hand was worse than left; she was having difficulty drying herself with towel in her right hand. These sensations gradually have improved; when I saw her left hand felt pretty much normal, and right hand had improved as well. She was not able to fully extend fingers 3-5 on her right hand. She also has slight tremor of right hand now, which is not her baseline. No numbness. She denies any head or neck symptoms. Feet are fine. She is otherwise fairly healthy. 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 Work-up in ED was unremarkable.     Past Surgical History:   Procedure Laterality Date    HERNIA REPAIR  02/10/2015    Inguinal Hernia Repair     Social History     Tobacco Use    Smoking status: Never    Smokeless tobacco: Never   Vaping Use    Vaping status: Never Used   Substance Use Topics    Alcohol use: Yes     Alcohol/week: 4.0 standard drinks of alcohol     Types: 4 Glasses of wine per week    Drug use: Never     Family History   Problem Relation Name Age of Onset    Heart disease Paternal Grandfather       Allergies  Patient has no known allergies.    Review of Systems   Constitutional: Negative.    HENT: Negative.     Respiratory: Negative. " "    Cardiovascular: Negative.    Gastrointestinal: Negative.    Genitourinary: Negative.    Musculoskeletal: Negative.    Skin: Negative.    Neurological:  Positive for tremors and weakness.   Psychiatric/Behavioral: Negative.         Last Recorded Vitals  Blood pressure (!) 140/97, pulse 70, temperature 36.5 °C (97.7 °F), temperature source Temporal, resp. rate 18, height 1.575 m (5' 2\"), weight 63.5 kg (140 lb), SpO2 97%.  Physical Exam  Cardiovascular:      Rate and Rhythm: Normal rate and regular rhythm.      Heart sounds: Normal heart sounds.   Pulmonary:      Breath sounds: Normal breath sounds.   Abdominal:      General: Bowel sounds are normal.      Palpations: Abdomen is soft.   Musculoskeletal:         General: Normal range of motion.   Neurological:      General: No focal deficit present.      Mental Status: She is alert and oriented to person, place, and time.      Comments: Slight tremor of right hand  Unable to fully extend fingers 3-5 on right hand  B/L finger weakness R>L   Psychiatric:         Mood and Affect: Mood normal.           Relevant Results           Assessment/Plan   Principal Problem:    Weakness of both hands  - central cause/stroke seems less likely; seems to be peripheral nerve issue  - consult to neuro         I spent 55 minutes in the professional and overall care of this patient.      Gurwinder Brown MD  "

## 2024-06-20 NOTE — ED NOTES
The pt has pain in bilat hands and in her biceps with no dexterity the left has good  strength right is a little weaker push pull is good.      Guido Solis RN  06/19/24 3979

## 2024-06-20 NOTE — ED PROVIDER NOTES
HPI   Chief Complaint   Patient presents with    Extremity Weakness     B/L Hands    Arm Pain       HPI  Patient has a history of some sort of neuropathic problem where she had trouble moving her right arm for 1 year.  At that time there was more severe pain.  Today she says she I disc back from a flight and was working out heavily and then came here.  She states she was hydrated.  She cannot fully extend her fingers on her right hand.  She is right-hand dominant.  She duplexes of occupational therapist.                  Ramer Coma Scale Score: 15                     Patient History   Past Medical History:   Diagnosis Date    HELLP syndrome (HELLP), unspecified trimester ([facility]) 11/15/2016    HELLP (hemolytic anemia/elev liver enzymes/low platelets in pregnancy)    Personal history of diseases of the skin and subcutaneous tissue 02/10/2015    History of alopecia     Past Surgical History:   Procedure Laterality Date    HERNIA REPAIR  02/10/2015    Inguinal Hernia Repair     Family History   Problem Relation Name Age of Onset    Heart disease Paternal Grandfather       Social History     Tobacco Use    Smoking status: Never    Smokeless tobacco: Never   Vaping Use    Vaping status: Never Used   Substance Use Topics    Alcohol use: Yes     Alcohol/week: 4.0 standard drinks of alcohol     Types: 4 Glasses of wine per week    Drug use: Never       Physical Exam   ED Triage Vitals [06/19/24 2212]   Temperature Heart Rate Respirations BP   36.9 °C (98.4 °F) 84 14 155/89      Pulse Ox Temp Source Heart Rate Source Patient Position   100 % Temporal Monitor Sitting      BP Location FiO2 (%)     Left arm --       Physical Exam  Vitals and nursing note reviewed.   Constitutional:       General: She is not in acute distress.     Appearance: She is well-developed.   HENT:      Head: Normocephalic and atraumatic.   Eyes:      Conjunctiva/sclera: Conjunctivae normal.   Cardiovascular:      Rate and Rhythm: Normal rate and  regular rhythm.      Heart sounds: No murmur heard.  Pulmonary:      Effort: Pulmonary effort is normal. No respiratory distress.      Breath sounds: Normal breath sounds.   Abdominal:      Palpations: Abdomen is soft.      Tenderness: There is no abdominal tenderness.   Musculoskeletal:         General: No swelling.      Cervical back: Neck supple.   Skin:     General: Skin is warm and dry.      Capillary Refill: Capillary refill takes less than 2 seconds.   Neurological:      Mental Status: She is alert.   Psychiatric:         Mood and Affect: Mood normal.         ED Course & MDM   Diagnoses as of 06/20/24 0357   Right arm weakness       Medical Decision Making  The patient does not have paralysis of her hand.  She is able to extend the fingers of her left hand but has trouble fully extending the left fingers.  Otherwise she can give a thumbs up oppose the first digit but not the fifth digit.  Unclear if this is peripheral neuropathy versus central.  The patient has no neck pain or tenderness.  Considered central cord syndrome or stroke possible but less likely.  Given trouble with extension consider radial nerve difficulties.  Appears to be in the more third fourth and fifth digit distribution consider ulnar neuropathy as well.    EKG interpreted by myself.  Normal sinus rhythm at a rate of 78 bpm.  Normal intervals.  Normal axis.  No signs of acute ischemia.      Procedure  Procedures     Enrique Veras MD  06/20/24 4120

## 2024-06-20 NOTE — NURSING NOTE
Denies numbness in arms or legs currently. Awaiting MRI. Spoke with MRI tech. Pt. Is on the list. Reassured pt. Of this.

## 2024-06-21 ENCOUNTER — APPOINTMENT (OUTPATIENT)
Dept: RADIOLOGY | Facility: HOSPITAL | Age: 44
DRG: 060 | End: 2024-06-21
Payer: COMMERCIAL

## 2024-06-21 PROBLEM — R29.898 RIGHT ARM WEAKNESS: Status: ACTIVE | Noted: 2024-06-21

## 2024-06-21 LAB — VIT B12 SERPL-MCNC: 273 PG/ML (ref 211–911)

## 2024-06-21 PROCEDURE — 2550000001 HC RX 255 CONTRASTS: Performed by: INTERNAL MEDICINE

## 2024-06-21 PROCEDURE — 36415 COLL VENOUS BLD VENIPUNCTURE: CPT | Performed by: PSYCHIATRY & NEUROLOGY

## 2024-06-21 PROCEDURE — 86038 ANTINUCLEAR ANTIBODIES: CPT | Mod: AHULAB | Performed by: PSYCHIATRY & NEUROLOGY

## 2024-06-21 PROCEDURE — 2500000004 HC RX 250 GENERAL PHARMACY W/ HCPCS (ALT 636 FOR OP/ED): Performed by: PSYCHIATRY & NEUROLOGY

## 2024-06-21 PROCEDURE — 70553 MRI BRAIN STEM W/O & W/DYE: CPT

## 2024-06-21 PROCEDURE — 86790 VIRUS ANTIBODY NOS: CPT | Performed by: PSYCHIATRY & NEUROLOGY

## 2024-06-21 PROCEDURE — 86256 FLUORESCENT ANTIBODY TITER: CPT | Performed by: PSYCHIATRY & NEUROLOGY

## 2024-06-21 PROCEDURE — A9575 INJ GADOTERATE MEGLUMI 0.1ML: HCPCS | Performed by: INTERNAL MEDICINE

## 2024-06-21 PROCEDURE — 99231 SBSQ HOSP IP/OBS SF/LOW 25: CPT | Performed by: PSYCHIATRY & NEUROLOGY

## 2024-06-21 PROCEDURE — 1100000001 HC PRIVATE ROOM DAILY

## 2024-06-21 PROCEDURE — 70553 MRI BRAIN STEM W/O & W/DYE: CPT | Performed by: STUDENT IN AN ORGANIZED HEALTH CARE EDUCATION/TRAINING PROGRAM

## 2024-06-21 RX ORDER — POLYETHYLENE GLYCOL 3350 17 G/17G
17 POWDER, FOR SOLUTION ORAL DAILY PRN
Status: DISCONTINUED | OUTPATIENT
Start: 2024-06-21 | End: 2024-06-24 | Stop reason: HOSPADM

## 2024-06-21 RX ORDER — GADOTERATE MEGLUMINE 376.9 MG/ML
13 INJECTION INTRAVENOUS
Status: COMPLETED | OUTPATIENT
Start: 2024-06-21 | End: 2024-06-21

## 2024-06-21 ASSESSMENT — COGNITIVE AND FUNCTIONAL STATUS - GENERAL
MOBILITY SCORE: 24
DAILY ACTIVITIY SCORE: 24

## 2024-06-21 ASSESSMENT — PAIN - FUNCTIONAL ASSESSMENT: PAIN_FUNCTIONAL_ASSESSMENT: 0-10

## 2024-06-21 ASSESSMENT — PAIN SCALES - GENERAL
PAINLEVEL_OUTOF10: 4
PAINLEVEL_OUTOF10: 0 - NO PAIN

## 2024-06-21 ASSESSMENT — VISUAL ACUITY: OU: 1

## 2024-06-21 NOTE — CARE PLAN
The patient's goals for the shift include     The clinical goals for the shift include pt wi not have any new neurolocical deficits this shift      Problem: Pain - Adult  Goal: Verbalizes/displays adequate comfort level or baseline comfort level  Outcome: Progressing     Problem: Safety - Adult  Goal: Free from fall injury  Outcome: Progressing     Problem: Discharge Planning  Goal: Discharge to home or other facility with appropriate resources  Outcome: Progressing     Problem: Chronic Conditions and Co-morbidities  Goal: Patient's chronic conditions and co-morbidity symptoms are monitored and maintained or improved  Outcome: Progressing

## 2024-06-21 NOTE — PROGRESS NOTES
Kaya Buchanan is a 43 y.o. female on day 0 of admission presenting with Weakness of both hands.      Subjective   Slight improvement to right hand weakness  No new complaints     Objective     Last Recorded Vitals  /87 (BP Location: Right arm, Patient Position: Lying)   Pulse 61   Temp 36.7 °C (98.1 °F) (Temporal)   Resp 18   Wt 63.5 kg (140 lb)   SpO2 95%   Intake/Output last 3 Shifts:  No intake or output data in the 24 hours ending 06/21/24 1258    Admission Weight  Weight: 63.5 kg (140 lb) (06/19/24 2212)    Daily Weight  06/19/24 : 63.5 kg (140 lb)    Image Results  MR cervical spine w and wo IV contrast  Narrative: Interpreted By:  Vinicio Brown,   STUDY:  MR CERVICAL SPINE W AND WO IV CONTRAST;  6/20/2024 10:14 pm      INDICATION:  Signs/Symptoms:Weakness and transient numbness of both hands,  bilateral Tromner signs, hyperactive right patellar reflex.  Evaluate  for cervical myelopathy..      COMPARISON:  None.      ACCESSION NUMBER(S):  [id number]      ORDERING CLINICIAN:  ALEXI VICTOR      TECHNIQUE:  Pre and post contrast multiplanar and multisequence images were  acquired through the cervical spine, thoracic spine, and lumbar  spine. Post contrast images were obtained after administration of 13  mL Dotarem intravenous contrast.      FINDINGS:      Alignment: There is straightening of normal cervical lordosis. No  subluxation.      Vertebrae/Intervertebral Discs: The vertebral body heights are  maintained. Bone marrow signal is benign. There is degenerative disc  space narrowing most pronounced at C4-C5 and C6-C7. There is disc  desiccation in midcervical spine.      There are no epidural collections.      Cord: Normal size and contour. There are nonenhancing areas of  elevated T2 signal within the bilateral gray matter of the spinal  cord at the level of C5-C6 with caudal extension on the right greater  than on the left extending to the level of C6-C7 on the right. This  is  demonstrated on axial images 17 through 20 of 32 and sagittal  images 9 and 10 of 19. There is also an area of faint enhancement in  the same ventral gray matter region at the level of C5-C6, axial  image 17 of 32. Questionable additional area of faint gray matter  spinal cord enhancement at the level of C7, axial image 24 of 32.      C1-C2: The cervicomedullary junction appears unremarkable. There is  no central canal stenosis.      C2-C3: There is no significant disc bulge or herniation. There is no  significant central canal or neural foraminal stenosis.      C3-C4: Small disc bulge.. There is no significant central canal or  neural foraminal stenosis.      C4-C5: There is diffuse disc bulge contributing to partial effacement  of the ventral thecal sac without significant central canal stenosis.  There is moderate bilateral foraminal stenosis due to posterolateral  osseous spurring.      C5-C6: There is no significant disc bulge or herniation. No  significant central canal stenosis. Mild bilateral foraminal  narrowing due to posterolateral osseous spurring.      C6-C7: Diffuse disc bulge without central canal stenosis. There is  moderate bilateral foraminal stenosis due to posterolateral osseous  spurring.      C7-T1: There is no significant disc bulge or herniation. There is no  significant central canal or neural foraminal stenosis.          Other: There is an enlarged nodular thyroid gland.      Impression: No central canal stenosis or bone marrow edema. Moderate bilateral  foraminal stenosis at C4-C5 and C6-C7 due to uncal facet arthropathy.      There are areas of increased T2 signal within the bilateral gray  matter of the spinal cord predominantly at the level of C6 with  caudal extension up to the level of C7. There is suggestion of faint  ventral gray matter enhancement in the same region at the level of  upper C6. This may represent nonspecific infectious, inflammatory, or  demyelinating myelitis with  differential including MOGAD.      Enlarged nodular thyroid. Correlation with thyroid function tests  recommended.      Signed by: Vinicio Brown 6/21/2024 3:09 AM  Dictation workstation:   [id number]      Physical Exam  Vitals and nursing note reviewed.   Constitutional:       Appearance: Normal appearance.   HENT:      Head: Normocephalic.      Nose: Nose normal.      Mouth/Throat:      Lips: Pink.      Mouth: Mucous membranes are moist.      Pharynx: Oropharynx is clear.   Eyes:      General: Lids are normal. Lids are everted, no foreign bodies appreciated. Vision grossly intact. Gaze aligned appropriately.      Extraocular Movements: Extraocular movements intact.      Conjunctiva/sclera: Conjunctivae normal.   Neck:      Trachea: Trachea normal.   Cardiovascular:      Rate and Rhythm: Normal rate.      Pulses: Normal pulses.      Heart sounds: Normal heart sounds.   Pulmonary:      Effort: Pulmonary effort is normal.      Breath sounds: Normal breath sounds.   Abdominal:      General: Abdomen is flat. Bowel sounds are normal.      Palpations: Abdomen is soft.   Musculoskeletal:         General: Normal range of motion.      Cervical back: Full passive range of motion without pain.   Feet:      Right foot:      Skin integrity: Skin integrity normal.      Left foot:      Skin integrity: Skin integrity normal.   Skin:     General: Skin is warm and dry.      Capillary Refill: Capillary refill takes less than 2 seconds.   Neurological:      General: No focal deficit present.      Mental Status: She is alert and oriented to person, place, and time. Mental status is at baseline.      Comments: Right hand weakness   Psychiatric:         Attention and Perception: Attention and perception normal.         Mood and Affect: Mood and affect normal.         Speech: Speech normal.         Behavior: Behavior normal. Behavior is cooperative.         Thought Content: Thought content normal.         Cognition and Memory: Cognition  normal.         Judgment: Judgment normal.         Relevant Results  Scheduled medications  methylPREDNISolone sodium succinate (PF), 1,000 mg, intravenous, q24h      Continuous medications     PRN medications  PRN medications: acetaminophen **OR** acetaminophen **OR** acetaminophen, ondansetron **OR** ondansetron  Results for orders placed or performed during the hospital encounter of 06/19/24 (from the past 24 hour(s))   Vitamin B12   Result Value Ref Range    Vitamin B12 273 211 - 911 pg/mL     MR cervical spine w and wo IV contrast    Result Date: 6/21/2024  Interpreted By:  Vinicio Brown, STUDY: MR CERVICAL SPINE W AND WO IV CONTRAST;  6/20/2024 10:14 pm   INDICATION: Signs/Symptoms:Weakness and transient numbness of both hands, bilateral Tromner signs, hyperactive right patellar reflex.  Evaluate for cervical myelopathy..   COMPARISON: None.   ACCESSION NUMBER(S): [id number]   ORDERING CLINICIAN: ALEXI VICTOR   TECHNIQUE: Pre and post contrast multiplanar and multisequence images were acquired through the cervical spine, thoracic spine, and lumbar spine. Post contrast images were obtained after administration of 13 mL Dotarem intravenous contrast.   FINDINGS:   Alignment: There is straightening of normal cervical lordosis. No subluxation.   Vertebrae/Intervertebral Discs: The vertebral body heights are maintained. Bone marrow signal is benign. There is degenerative disc space narrowing most pronounced at C4-C5 and C6-C7. There is disc desiccation in midcervical spine.   There are no epidural collections.   Cord: Normal size and contour. There are nonenhancing areas of elevated T2 signal within the bilateral gray matter of the spinal cord at the level of C5-C6 with caudal extension on the right greater than on the left extending to the level of C6-C7 on the right. This is demonstrated on axial images 17 through 20 of 32 and sagittal images 9 and 10 of 19. There is also an area of faint enhancement in the  same ventral gray matter region at the level of C5-C6, axial image 17 of 32. Questionable additional area of faint gray matter spinal cord enhancement at the level of C7, axial image 24 of 32.   C1-C2: The cervicomedullary junction appears unremarkable. There is no central canal stenosis.   C2-C3: There is no significant disc bulge or herniation. There is no significant central canal or neural foraminal stenosis.   C3-C4: Small disc bulge.. There is no significant central canal or neural foraminal stenosis.   C4-C5: There is diffuse disc bulge contributing to partial effacement of the ventral thecal sac without significant central canal stenosis. There is moderate bilateral foraminal stenosis due to posterolateral osseous spurring.   C5-C6: There is no significant disc bulge or herniation. No significant central canal stenosis. Mild bilateral foraminal narrowing due to posterolateral osseous spurring.   C6-C7: Diffuse disc bulge without central canal stenosis. There is moderate bilateral foraminal stenosis due to posterolateral osseous spurring.   C7-T1: There is no significant disc bulge or herniation. There is no significant central canal or neural foraminal stenosis.     Other: There is an enlarged nodular thyroid gland.       No central canal stenosis or bone marrow edema. Moderate bilateral foraminal stenosis at C4-C5 and C6-C7 due to uncal facet arthropathy.   There are areas of increased T2 signal within the bilateral gray matter of the spinal cord predominantly at the level of C6 with caudal extension up to the level of C7. There is suggestion of faint ventral gray matter enhancement in the same region at the level of upper C6. This may represent nonspecific infectious, inflammatory, or demyelinating myelitis with differential including MOGAD.   Enlarged nodular thyroid. Correlation with thyroid function tests recommended.   Signed by: Vinicio Brown 6/21/2024 3:09 AM Dictation workstation:    [id number]    ECG 12 lead    Result Date: 6/20/2024  Normal sinus rhythm Cannot rule out Anterior infarct , age undetermined Abnormal ECG No previous ECGs available See ED provider note for full interpretation and clinical correlation Confirmed by Amanda Andrews ([id number]) on 6/20/2024 6:00:48 PM    CT head wo IV contrast    Result Date: 6/20/2024  Interpreted By:  Clemente Knapp, STUDY: CT HEAD WO IV CONTRAST;  6/20/2024 12:25 am   INDICATION: Signs/Symptoms:r hand weakness.   COMPARISON: None   ACCESSION NUMBER(S): [id number]   ORDERING CLINICIAN: ROBIN SULLIVAN   TECHNIQUE: Contiguous axial images of the head were obtained without intravenous contrast.   FINDINGS: BRAIN PARENCHYMA:   The gray white matter differentiation is preserved.  No mass effect or midline shift. Hypodensity in the right basal ganglia may correspond to prominent perivascular space or old lacunar infarct.   HEMORRHAGE:  No evidence of acute intracranial hemorrhage. VENTRICLES AND EXTRA-AXIAL SPACES:  The ventricles are within normal limits in size for brain volume.  No evidence of abnormal extraaxial fluid collection. EXTRACRANIAL SOFT TISSUES:  Within normal limits. PARANASAL SINUSES/MASTOIDS:  The visualized paranasal sinuses and mastoid air cells are clear and well pneumatized. CALVARIUM:  No evidence of depressed calvarial fracture.   OTHER FINDINGS:  None       No evidence of acute intracranial hemorrhage or mass effect.   Hypodensity in the right basal ganglia may correspond to prominent perivascular space or old lacunar infarct.   MACRO: None   Signed by: Clemente Knapp 6/20/2024 1:04 AM Dictation workstation:   [id number]       Assessment/Plan      Principal Problem:    Weakness of both hands  Active Problems:    Right arm weakness    Persistent right hand weakness  -C spine MRI reviewed and shows a number of features described in MOGAD including H sign, linear hyperintensity on sagittal view, and subtle leptomeningeal enhancement  (also has limited enhancement of central gray matter in region of H sign).   -MRI Brain pending  -cns demyelinating disease blood panel pending  -Starting IV solumedrol x 3 doses  -Upgraded to inpatient  -Accepted under Dr Valenzuela service    VTE/GI prophylaxis  - SCDs/ambulation PRN Miralax     Discharge disposition  - HNN once medically stable/ neurology clearance pending     Interdisciplinary rounding completed with Attending Provider, Bedside RN and TCC.  Labs, results and plan of care discussed and reviewed with Dr. Clau Singh, APRN-CNP

## 2024-06-21 NOTE — PROGRESS NOTES
06/21/24 1026   Discharge Planning   Who is requesting discharge planning? Provider   Home or Post Acute Services None   Patient expects to be discharged to: Home   Does the patient need discharge transport arranged? No     6/21/24 1026  Waiting on neuro recs.  Anticipate discharge home today with no needs.  Marylin Mendes RN TCC

## 2024-06-21 NOTE — PROGRESS NOTES
"Kaya Buchanan is a 43 y.o. female on day 0 of admission presenting with Weakness of both hands.      Subjective     Feels right hand somewhat better today.     No new complaints.       Objective     Last Recorded Vitals  Blood pressure 125/76, pulse 62, temperature 36 °C (96.8 °F), temperature source Temporal, resp. rate 18, height 1.575 m (5' 2\"), weight 63.5 kg (140 lb), SpO2 98%.    Physical Exam  Neurological Exam    Alert and appropriate in conversation. Hand strength improved right to 4/5 ulnar intrinsics. Left ulnar intrinsics remain same as yesterday. Rapid tremor right hand as yesterday.       Relevant Results    C spine MRI reviewed and shows a number of features described in MOGAD including H sign, linear hyperintensity on sagittal view, and subtle leptomeningeal enhancement (also has limited enhancement of central gray matter in region of H sign).                       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 I suspect onset was in 2017.     I recommend a 3 day course of SoluMedrol and she is agreeable.    Labs pending including CNS demyelinating disease panel.    Ordering brain MRI.     She will need outpatient appointment with neuroimmunology (Dr. Ghassan Willis or Kyrie Matt).                     Vin Yusuf MD  "

## 2024-06-22 LAB
ALBUMIN SERPL BCP-MCNC: 3.7 G/DL (ref 3.4–5)
ALP SERPL-CCNC: 53 U/L (ref 33–110)
ALT SERPL W P-5'-P-CCNC: 9 U/L (ref 7–45)
ANION GAP SERPL CALC-SCNC: 14 MMOL/L (ref 10–20)
AST SERPL W P-5'-P-CCNC: 13 U/L (ref 9–39)
BILIRUB SERPL-MCNC: 0.5 MG/DL (ref 0–1.2)
BUN SERPL-MCNC: 13 MG/DL (ref 6–23)
CALCIUM SERPL-MCNC: 8.9 MG/DL (ref 8.6–10.3)
CHLORIDE SERPL-SCNC: 104 MMOL/L (ref 98–107)
CO2 SERPL-SCNC: 20 MMOL/L (ref 21–32)
COPPER SERPL-MCNC: 179 UG/DL (ref 80–155)
CREAT SERPL-MCNC: 0.73 MG/DL (ref 0.5–1.05)
EGFRCR SERPLBLD CKD-EPI 2021: >90 ML/MIN/1.73M*2
ERYTHROCYTE [DISTWIDTH] IN BLOOD BY AUTOMATED COUNT: 12.8 % (ref 11.5–14.5)
GLUCOSE SERPL-MCNC: 144 MG/DL (ref 74–99)
HCT VFR BLD AUTO: 43.8 % (ref 36–46)
HGB BLD-MCNC: 14.2 G/DL (ref 12–16)
HTLV I+II AB SER QL IA: NEGATIVE
MCH RBC QN AUTO: 28.4 PG (ref 26–34)
MCHC RBC AUTO-ENTMCNC: 32.4 G/DL (ref 32–36)
MCV RBC AUTO: 88 FL (ref 80–100)
NRBC BLD-RTO: 0 /100 WBCS (ref 0–0)
PLATELET # BLD AUTO: 178 X10*3/UL (ref 150–450)
POTASSIUM SERPL-SCNC: 4 MMOL/L (ref 3.5–5.3)
PROT SERPL-MCNC: 6.7 G/DL (ref 6.4–8.2)
RBC # BLD AUTO: 5 X10*6/UL (ref 4–5.2)
SODIUM SERPL-SCNC: 134 MMOL/L (ref 136–145)
WBC # BLD AUTO: 7 X10*3/UL (ref 4.4–11.3)

## 2024-06-22 PROCEDURE — 85027 COMPLETE CBC AUTOMATED: CPT

## 2024-06-22 PROCEDURE — 80053 COMPREHEN METABOLIC PANEL: CPT

## 2024-06-22 PROCEDURE — 2500000001 HC RX 250 WO HCPCS SELF ADMINISTERED DRUGS (ALT 637 FOR MEDICARE OP): Performed by: PSYCHIATRY & NEUROLOGY

## 2024-06-22 PROCEDURE — 36415 COLL VENOUS BLD VENIPUNCTURE: CPT

## 2024-06-22 PROCEDURE — 2500000004 HC RX 250 GENERAL PHARMACY W/ HCPCS (ALT 636 FOR OP/ED): Performed by: PSYCHIATRY & NEUROLOGY

## 2024-06-22 PROCEDURE — 1100000001 HC PRIVATE ROOM DAILY

## 2024-06-22 RX ORDER — LANOLIN ALCOHOL/MO/W.PET/CERES
2000 CREAM (GRAM) TOPICAL DAILY
Status: DISCONTINUED | OUTPATIENT
Start: 2024-06-22 | End: 2024-06-24 | Stop reason: HOSPADM

## 2024-06-22 ASSESSMENT — PAIN SCALES - GENERAL
PAINLEVEL_OUTOF10: 0 - NO PAIN
PAINLEVEL_OUTOF10: 0 - NO PAIN
PAINLEVEL_OUTOF10: 3
PAINLEVEL_OUTOF10: 6

## 2024-06-22 ASSESSMENT — COGNITIVE AND FUNCTIONAL STATUS - GENERAL
MOBILITY SCORE: 24
DAILY ACTIVITIY SCORE: 24
MOBILITY SCORE: 24
DAILY ACTIVITIY SCORE: 24

## 2024-06-22 ASSESSMENT — PAIN DESCRIPTION - LOCATION: LOCATION: HEAD

## 2024-06-22 ASSESSMENT — PAIN - FUNCTIONAL ASSESSMENT: PAIN_FUNCTIONAL_ASSESSMENT: 0-10

## 2024-06-22 NOTE — CARE PLAN
The patient's goals for the shift include to decrease weakness    The clinical goals for the shift include pt will not complain of any changs to dexterity or numbness this shift      Problem: Pain - Adult  Goal: Verbalizes/displays adequate comfort level or baseline comfort level  Outcome: Progressing     Problem: Safety - Adult  Goal: Free from fall injury  Outcome: Progressing     Problem: Discharge Planning  Goal: Discharge to home or other facility with appropriate resources  Outcome: Progressing

## 2024-06-22 NOTE — CARE PLAN
The patient's goals for the shift include to decrease weakness    The clinical goals for the shift include pt will not complain of any changs to dexterity or numbness this shift    Over the shift, the patient did make progress toward the following goals:  Improve hand functioning bilaterally.

## 2024-06-22 NOTE — SIGNIFICANT EVENT
Not in room when I came to round this morning.    Brain MRI reviewed and shows no notable findings except the well circumscribed ovoid hypointense focus R mesial temporal region,  which looks most likely to be a giant perivascular space. No findings specifically suggestive of active or chronic intracranial demyelinating disease.    B12 level low normal at 273. I ordered 2000 mcg orally daily replacement.    Other labs pending.     Continue Solumedrol. Will assess tomorrow.

## 2024-06-22 NOTE — NURSING NOTE
Report called to Nurse for rm 531. Pt  walkwd to new room, accompanied by this nurse. Denies needs. All belongings with pt. From rm. 118.

## 2024-06-22 NOTE — PROGRESS NOTES
INTERNAL MEDICINE PROGRESS NOTE      HPI:    Patient reports improvement in bilateral hand strength especially on the left side.  She has had no dizziness or difficulty with ambulation.    Vital signs in last 24 hours:  Temp:  [35.9 °C (96.6 °F)-36.7 °C (98.1 °F)] 36.2 °C (97.1 °F)  Heart Rate:  [61-84] 84  Resp:  [16-18] 18  BP: (129-136)/(82-91) 129/88    Physical Examination:  Physical Exam      Constitutional:       Appearance: Middle-aged, well-built, in no distress  HENT:      Head: Normocephalic and atraumatic.   Eyes:      Extraocular Movements: Extraocular movements intact.      Pupils: Pupils are equal, round, and reactive to light.   Cardiovascular:      Rate and Rhythm: Normal rate and regular rhythm.      Pulses: Normal pulses.      Heart sounds: Normal heart sounds.   Pulmonary:      Effort: Pulmonary effort is normal.      Breath sounds: Normal breath sounds.   Abdominal:      General: Abdomen is flat. Bowel sounds are normal.      Palpations: Abdomen is soft.   Musculoskeletal:         General: Normal range of motion.      Cervical back: Normal range of motion and neck supple.   Skin:     General: Skin is warm and dry.   Neurological:      General: No focal motor deficit present.      Mental Status: Alert and oriented to person, place, and time. Mental status is at baseline.      Medications:    Current Facility-Administered Medications:     acetaminophen (Tylenol) tablet 650 mg, 650 mg, oral, q4h PRN, 650 mg at 06/21/24 0831 **OR** acetaminophen (Tylenol) oral liquid 650 mg, 650 mg, oral, q4h PRN **OR** acetaminophen (Tylenol) suppository 650 mg, 650 mg, rectal, q4h PRN, Gurwinder Brown MD    cyanocobalamin (Vitamin B-12) tablet 2,000 mcg, 2,000 mcg, oral, Daily, Vin Yusuf MD, 2,000 mcg at 06/22/24 1051    methylPREDNISolone sodium succinate (SOLU-Medrol) 1,000 mg in dextrose 5% 100 mL IV, 1,000 mg, intravenous, q24h, Vin Yusuf MD, Stopped at 06/21/24 7397    ondansetron  "(Zofran) tablet 4 mg, 4 mg, oral, q8h PRN **OR** ondansetron (Zofran) injection 4 mg, 4 mg, intravenous, q8h PRN, Gurwinder Brown MD    polyethylene glycol (Glycolax, Miralax) packet 17 g, 17 g, oral, Daily PRN, Kiah Singh, APRN-CNP    Laboratory Findings:  Lab Results   Component Value Date    WBC 7.0 06/22/2024    HGB 14.2 06/22/2024    HCT 43.8 06/22/2024    MCV 88 06/22/2024     06/22/2024     No results found for: \"INR\", \"PROTIME\"  Lab Results   Component Value Date    GLUCOSE 144 (H) 06/22/2024    CALCIUM 8.9 06/22/2024     (L) 06/22/2024    K 4.0 06/22/2024    CO2 20 (L) 06/22/2024     06/22/2024    BUN 13 06/22/2024    CREATININE 0.73 06/22/2024       Assessment and Plan:    Bilateral hand weakness -being treated with Solu-Medrol with good results.  Demyelinating panel pending, neurology following.  Thyromegaly -has been evaluated in the past with a biopsy which was negative.  Outpatient follow-up.  Tremor -subtle, treatment as above.      Sadiq Valenzuela MD  06/22/24  10:55 AM         "

## 2024-06-23 VITALS
WEIGHT: 140 LBS | RESPIRATION RATE: 16 BRPM | OXYGEN SATURATION: 96 % | BODY MASS INDEX: 25.76 KG/M2 | HEIGHT: 62 IN | TEMPERATURE: 97.1 F | HEART RATE: 82 BPM | DIASTOLIC BLOOD PRESSURE: 64 MMHG | SYSTOLIC BLOOD PRESSURE: 112 MMHG

## 2024-06-23 DIAGNOSIS — G95.9 CERVICAL MYELOPATHY (MULTI): Primary | ICD-10-CM

## 2024-06-23 LAB
ALBUMIN SERPL BCP-MCNC: 3.7 G/DL (ref 3.4–5)
ALP SERPL-CCNC: 55 U/L (ref 33–110)
ALT SERPL W P-5'-P-CCNC: 10 U/L (ref 7–45)
ANION GAP SERPL CALC-SCNC: 14 MMOL/L (ref 10–20)
AST SERPL W P-5'-P-CCNC: 12 U/L (ref 9–39)
BILIRUB SERPL-MCNC: 0.6 MG/DL (ref 0–1.2)
BUN SERPL-MCNC: 18 MG/DL (ref 6–23)
CALCIUM SERPL-MCNC: 9.1 MG/DL (ref 8.6–10.3)
CHLORIDE SERPL-SCNC: 102 MMOL/L (ref 98–107)
CO2 SERPL-SCNC: 26 MMOL/L (ref 21–32)
CREAT SERPL-MCNC: 0.8 MG/DL (ref 0.5–1.05)
EGFRCR SERPLBLD CKD-EPI 2021: >90 ML/MIN/1.73M*2
ERYTHROCYTE [DISTWIDTH] IN BLOOD BY AUTOMATED COUNT: 12.9 % (ref 11.5–14.5)
GLUCOSE SERPL-MCNC: 102 MG/DL (ref 74–99)
HCT VFR BLD AUTO: 44.6 % (ref 36–46)
HGB BLD-MCNC: 14.6 G/DL (ref 12–16)
MCH RBC QN AUTO: 28.5 PG (ref 26–34)
MCHC RBC AUTO-ENTMCNC: 32.7 G/DL (ref 32–36)
MCV RBC AUTO: 87 FL (ref 80–100)
NRBC BLD-RTO: 0 /100 WBCS (ref 0–0)
PLATELET # BLD AUTO: 136 X10*3/UL (ref 150–450)
POTASSIUM SERPL-SCNC: 4.1 MMOL/L (ref 3.5–5.3)
PROT SERPL-MCNC: 6.9 G/DL (ref 6.4–8.2)
RBC # BLD AUTO: 5.12 X10*6/UL (ref 4–5.2)
SODIUM SERPL-SCNC: 138 MMOL/L (ref 136–145)
WBC # BLD AUTO: 12.4 X10*3/UL (ref 4.4–11.3)

## 2024-06-23 PROCEDURE — 2500000001 HC RX 250 WO HCPCS SELF ADMINISTERED DRUGS (ALT 637 FOR MEDICARE OP): Performed by: PSYCHIATRY & NEUROLOGY

## 2024-06-23 PROCEDURE — 99231 SBSQ HOSP IP/OBS SF/LOW 25: CPT | Performed by: PSYCHIATRY & NEUROLOGY

## 2024-06-23 PROCEDURE — 85027 COMPLETE CBC AUTOMATED: CPT

## 2024-06-23 PROCEDURE — 80053 COMPREHEN METABOLIC PANEL: CPT

## 2024-06-23 PROCEDURE — 36415 COLL VENOUS BLD VENIPUNCTURE: CPT

## 2024-06-23 PROCEDURE — 1100000001 HC PRIVATE ROOM DAILY

## 2024-06-23 PROCEDURE — 2500000004 HC RX 250 GENERAL PHARMACY W/ HCPCS (ALT 636 FOR OP/ED): Performed by: PSYCHIATRY & NEUROLOGY

## 2024-06-23 RX ORDER — PREDNISONE 20 MG/1
40 TABLET ORAL DAILY
Status: DISCONTINUED | OUTPATIENT
Start: 2024-06-27 | End: 2024-06-24 | Stop reason: HOSPADM

## 2024-06-23 RX ORDER — PREDNISONE 10 MG/1
10 TABLET ORAL DAILY
Status: DISCONTINUED | OUTPATIENT
Start: 2024-06-30 | End: 2024-06-24 | Stop reason: HOSPADM

## 2024-06-23 RX ORDER — PREDNISONE 20 MG/1
20 TABLET ORAL DAILY
Status: DISCONTINUED | OUTPATIENT
Start: 2024-06-29 | End: 2024-06-24 | Stop reason: HOSPADM

## 2024-06-23 RX ORDER — PREDNISONE 20 MG/1
60 TABLET ORAL DAILY
Status: DISCONTINUED | OUTPATIENT
Start: 2024-06-24 | End: 2024-06-24 | Stop reason: HOSPADM

## 2024-06-23 ASSESSMENT — COGNITIVE AND FUNCTIONAL STATUS - GENERAL
DAILY ACTIVITIY SCORE: 24
MOBILITY SCORE: 24
MOBILITY SCORE: 24
DAILY ACTIVITIY SCORE: 24

## 2024-06-23 ASSESSMENT — PAIN SCALES - GENERAL
PAINLEVEL_OUTOF10: 0 - NO PAIN
PAINLEVEL_OUTOF10: 0 - NO PAIN

## 2024-06-23 ASSESSMENT — PAIN - FUNCTIONAL ASSESSMENT
PAIN_FUNCTIONAL_ASSESSMENT: 0-10
PAIN_FUNCTIONAL_ASSESSMENT: 0-10

## 2024-06-23 NOTE — PROGRESS NOTES
"Kaya Buchanan is a 43 y.o. female on day 2 of admission presenting with Weakness of both hands.      Subjective   She received part of the second dose of Solu-Medrol yesterday before losing IV access.  After several attempts and IV has been placed in the right dorsal hand which is apparently patent.  She has not yet received a dose of Solu-Medrol today.    She does feel that her hands have gotten stronger.  She is tolerating Solu-Medrol without noted side effects.  She notes no new neurological symptoms.       Objective     Last Recorded Vitals  Blood pressure 147/89, pulse 82, temperature 36.4 °C (97.6 °F), temperature source Oral, resp. rate 18, height 1.575 m (5' 2\"), weight 63.5 kg (140 lb), SpO2 97%.    Physical Exam  Neurological Exam    Sitting up in bed in no acute distress.  Alert and appropriate in conversation.  No gaze deviation, facial motor asymmetry or dysarthria.  Hyperextended posture at right MCP joints as before.  Right ulnar intrinsic strength improved to 4+.  Left ulnar intrinsics 4.  APB 4+ bilaterally.  Finger extensors 5 bilaterally.      Relevant Results    CNS demyelinating disease panel (including MOG antibody) still pending.                      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    I reviewed with her that the brain MRI was unremarkable but that this is not unusual in MOGAD.    Recommend giving 1 final 1 g dose of intravenous Solu-Medrol today then she can be discharged home.    I discussed with her starting on a prednisone taper tomorrow.  I advised her to avoid NSAIDs while taking prednisone due to risk of gastropathy.    I will keep an eye out for the result of the CNS " demyelinating disease panel and my office will contact the patient.    I will place an order for an outpatient consultation with a neuro immunology colleague to discuss long-term management.               Vin Yusuf MD

## 2024-06-23 NOTE — PROGRESS NOTES
INTERNAL MEDICINE PROGRESS NOTE      HPI:    Patient reports normalization of hand strength.  She has minimal paresthesias.  She had IV access issues and received about 90% of second dose of Solu-Medrol.    Vital signs in last 24 hours:  Temp:  [36.4 °C (97.5 °F)-36.8 °C (98.2 °F)] 36.4 °C (97.6 °F)  Heart Rate:  [72-88] 82  Resp:  [16-18] 18  BP: (122-153)/(71-94) 147/89    Physical Examination:  Physical Exam      Constitutional:       Appearance: Middle-aged, well-built, in no distress  HENT:      Head: Normocephalic and atraumatic.   Eyes:      Extraocular Movements: Extraocular movements intact.      Pupils: Pupils are equal, round, and reactive to light.   Cardiovascular:      Rate and Rhythm: Normal rate and regular rhythm.      Pulses: Normal pulses.      Heart sounds: Normal heart sounds.   Pulmonary:      Effort: Pulmonary effort is normal.      Breath sounds: Normal breath sounds.   Abdominal:      General: Abdomen is flat. Bowel sounds are normal.      Palpations: Abdomen is soft.   Musculoskeletal:         General: Normal range of motion.      Cervical back: Normal range of motion and neck supple.   Skin:     General: Skin is warm and dry.   Neurological:      General: No focal motor deficit present.      Mental Status: Alert and oriented to person, place, and time. Mental status is at baseline.      Medications:    Current Facility-Administered Medications:     acetaminophen (Tylenol) tablet 650 mg, 650 mg, oral, q4h PRN, 650 mg at 06/22/24 1809 **OR** acetaminophen (Tylenol) oral liquid 650 mg, 650 mg, oral, q4h PRN **OR** acetaminophen (Tylenol) suppository 650 mg, 650 mg, rectal, q4h PRN, Gurwinder Brown MD    cyanocobalamin (Vitamin B-12) tablet 2,000 mcg, 2,000 mcg, oral, Daily, Vin Yusuf MD, 2,000 mcg at 06/23/24 0916    methylPREDNISolone sodium succinate (SOLU-Medrol) 1,000 mg in dextrose 5% 100 mL IV, 1,000 mg, intravenous, q24h, Vin Yusuf MD, Stopped at 06/22/24  "1606    ondansetron (Zofran) tablet 4 mg, 4 mg, oral, q8h PRN **OR** ondansetron (Zofran) injection 4 mg, 4 mg, intravenous, q8h PRN, Gurwinder Brown MD    polyethylene glycol (Glycolax, Miralax) packet 17 g, 17 g, oral, Daily PRN, Kiah Singh, APRN-CNP    Laboratory Findings:  Lab Results   Component Value Date    WBC 12.4 (H) 06/23/2024    HGB 14.6 06/23/2024    HCT 44.6 06/23/2024    MCV 87 06/23/2024     (L) 06/23/2024     No results found for: \"INR\", \"PROTIME\"  Lab Results   Component Value Date    GLUCOSE 102 (H) 06/23/2024    CALCIUM 9.1 06/23/2024     06/23/2024    K 4.1 06/23/2024    CO2 26 06/23/2024     06/23/2024    BUN 18 06/23/2024    CREATININE 0.80 06/23/2024       Assessment and Plan:    Bilateral hand weakness -third dose of Solu-Medrol today.  Improved.  Workup ongoing.  Thyromegaly -outpatient follow-up.  Tremor -subtle, treatment as above.    Possible discharge today if cleared by neurology.      Sadiq Valenzuela MD  06/23/24  10:43 AM         "

## 2024-06-23 NOTE — CARE PLAN
The patient's goals for the shift include to decrease weakness    The clinical goals for the shift include Pt will remain HDS

## 2024-06-23 NOTE — CARE PLAN
Problem: Pain - Adult  Goal: Verbalizes/displays adequate comfort level or baseline comfort level  Outcome: Progressing     Problem: Safety - Adult  Goal: Free from fall injury  Outcome: Progressing     Problem: Discharge Planning  Goal: Discharge to home or other facility with appropriate resources  Outcome: Progressing     Problem: Chronic Conditions and Co-morbidities  Goal: Patient's chronic conditions and co-morbidity symptoms are monitored and maintained or improved  Outcome: Progressing   The patient's goals for the shift include to decrease weakness    The clinical goals for the shift include pt will not complain of any changs to dexterity or numbness this shift

## 2024-06-24 ENCOUNTER — PHARMACY VISIT (OUTPATIENT)
Dept: PHARMACY | Facility: CLINIC | Age: 44
End: 2024-06-24
Payer: MEDICARE

## 2024-06-24 VITALS
SYSTOLIC BLOOD PRESSURE: 145 MMHG | OXYGEN SATURATION: 98 % | WEIGHT: 140 LBS | RESPIRATION RATE: 20 BRPM | TEMPERATURE: 98.9 F | HEART RATE: 81 BPM | BODY MASS INDEX: 25.76 KG/M2 | DIASTOLIC BLOOD PRESSURE: 96 MMHG | HEIGHT: 62 IN

## 2024-06-24 LAB
ALBUMIN SERPL BCP-MCNC: 3.7 G/DL (ref 3.4–5)
ALP SERPL-CCNC: 56 U/L (ref 33–110)
ALT SERPL W P-5'-P-CCNC: 11 U/L (ref 7–45)
ANION GAP SERPL CALC-SCNC: 12 MMOL/L (ref 10–20)
AST SERPL W P-5'-P-CCNC: 12 U/L (ref 9–39)
BILIRUB SERPL-MCNC: 0.7 MG/DL (ref 0–1.2)
BUN SERPL-MCNC: 23 MG/DL (ref 6–23)
CALCIUM SERPL-MCNC: 8.9 MG/DL (ref 8.6–10.3)
CHLORIDE SERPL-SCNC: 102 MMOL/L (ref 98–107)
CO2 SERPL-SCNC: 27 MMOL/L (ref 21–32)
CREAT SERPL-MCNC: 0.84 MG/DL (ref 0.5–1.05)
EGFRCR SERPLBLD CKD-EPI 2021: 90 ML/MIN/1.73M*2
ERYTHROCYTE [DISTWIDTH] IN BLOOD BY AUTOMATED COUNT: 12.9 % (ref 11.5–14.5)
GLUCOSE SERPL-MCNC: 102 MG/DL (ref 74–99)
HCT VFR BLD AUTO: 44.5 % (ref 36–46)
HGB BLD-MCNC: 14.4 G/DL (ref 12–16)
MCH RBC QN AUTO: 28.1 PG (ref 26–34)
MCHC RBC AUTO-ENTMCNC: 32.4 G/DL (ref 32–36)
MCV RBC AUTO: 87 FL (ref 80–100)
NRBC BLD-RTO: 0 /100 WBCS (ref 0–0)
PLATELET # BLD AUTO: 158 X10*3/UL (ref 150–450)
POTASSIUM SERPL-SCNC: 4 MMOL/L (ref 3.5–5.3)
PROT SERPL-MCNC: 6.7 G/DL (ref 6.4–8.2)
RBC # BLD AUTO: 5.12 X10*6/UL (ref 4–5.2)
SODIUM SERPL-SCNC: 137 MMOL/L (ref 136–145)
WBC # BLD AUTO: 10.7 X10*3/UL (ref 4.4–11.3)

## 2024-06-24 PROCEDURE — RXMED WILLOW AMBULATORY MEDICATION CHARGE

## 2024-06-24 PROCEDURE — 2500000004 HC RX 250 GENERAL PHARMACY W/ HCPCS (ALT 636 FOR OP/ED): Performed by: PSYCHIATRY & NEUROLOGY

## 2024-06-24 PROCEDURE — 85027 COMPLETE CBC AUTOMATED: CPT

## 2024-06-24 PROCEDURE — 80053 COMPREHEN METABOLIC PANEL: CPT

## 2024-06-24 PROCEDURE — 36415 COLL VENOUS BLD VENIPUNCTURE: CPT

## 2024-06-24 PROCEDURE — 2500000001 HC RX 250 WO HCPCS SELF ADMINISTERED DRUGS (ALT 637 FOR MEDICARE OP): Performed by: PSYCHIATRY & NEUROLOGY

## 2024-06-24 RX ORDER — PREDNISONE 10 MG/1
10 TABLET ORAL DAILY
Qty: 1 TABLET | Refills: 0 | Status: SHIPPED | OUTPATIENT
Start: 2024-06-30 | End: 2024-07-01

## 2024-06-24 RX ORDER — PREDNISONE 20 MG/1
TABLET ORAL
Qty: 11 TABLET | Refills: 0 | Status: SHIPPED | OUTPATIENT
Start: 2024-06-25 | End: 2024-07-04

## 2024-06-24 RX ORDER — CYANOCOBALAMIN (VITAMIN B-12) 2000 MCG
2000 TABLET ORAL DAILY
Start: 2024-06-25

## 2024-06-24 ASSESSMENT — PAIN - FUNCTIONAL ASSESSMENT: PAIN_FUNCTIONAL_ASSESSMENT: 0-10

## 2024-06-24 ASSESSMENT — PAIN SCALES - GENERAL: PAINLEVEL_OUTOF10: 0 - NO PAIN

## 2024-06-24 NOTE — PROGRESS NOTES
INTERNAL MEDICINE PROGRESS NOTE      HPI:    Patient is doing well.  She has had continued improvement in neurologic symptoms.    Vital signs in last 24 hours:  Temp:  [36.2 °C (97.1 °F)-36.9 °C (98.5 °F)] 36.9 °C (98.5 °F)  Heart Rate:  [58-82] 62  Resp:  [16-18] 18  BP: (112-162)/(64-94) 142/84    Physical Examination:  Physical Exam      Constitutional:       Appearance: Middle-aged, well-built, in no distress  HENT:      Head: Normocephalic and atraumatic.   Eyes:      Extraocular Movements: Extraocular movements intact.      Pupils: Pupils are equal, round, and reactive to light.   Cardiovascular:      Rate and Rhythm: Normal rate and regular rhythm.      Pulses: Normal pulses.      Heart sounds: Normal heart sounds.   Pulmonary:      Effort: Pulmonary effort is normal.      Breath sounds: Normal breath sounds.   Abdominal:      General: Abdomen is flat. Bowel sounds are normal.      Palpations: Abdomen is soft.   Musculoskeletal:         General: Normal range of motion.      Cervical back: Normal range of motion and neck supple.   Skin:     General: Skin is warm and dry.   Neurological:      General: No focal motor deficit present.      Mental Status: Alert and oriented to person, place, and time. Mental status is at baseline.      Medications:    Current Facility-Administered Medications:     acetaminophen (Tylenol) tablet 650 mg, 650 mg, oral, q4h PRN, 650 mg at 06/22/24 1809 **OR** acetaminophen (Tylenol) oral liquid 650 mg, 650 mg, oral, q4h PRN **OR** acetaminophen (Tylenol) suppository 650 mg, 650 mg, rectal, q4h PRN, Gurwinder Brown MD    cyanocobalamin (Vitamin B-12) tablet 2,000 mcg, 2,000 mcg, oral, Daily, iVn Yusuf MD, 2,000 mcg at 06/24/24 0906    ondansetron (Zofran) tablet 4 mg, 4 mg, oral, q8h PRN **OR** ondansetron (Zofran) injection 4 mg, 4 mg, intravenous, q8h PRN, Gurwinder Brown MD    polyethylene glycol (Glycolax, Miralax) packet 17 g, 17 g, oral, Daily PRN, Kiah  "Francisco, APRN-CNP    [START ON 6/30/2024] predniSONE (Deltasone) tablet 10 mg, 10 mg, oral, Daily, David Coleman, PharmD    predniSONE (Deltasone) tablet 60 mg, 60 mg, oral, Daily, 60 mg at 06/24/24 0906 **FOLLOWED BY** [START ON 6/27/2024] predniSONE (Deltasone) tablet 40 mg, 40 mg, oral, Daily **FOLLOWED BY** [START ON 6/28/2024] predniSONE (Deltasone) tablet 30 mg, 30 mg, oral, Daily **FOLLOWED BY** [START ON 6/29/2024] predniSONE (Deltasone) tablet 20 mg, 20 mg, oral, Daily, Vin Yusuf MD    Laboratory Findings:  Lab Results   Component Value Date    WBC 10.7 06/24/2024    HGB 14.4 06/24/2024    HCT 44.5 06/24/2024    MCV 87 06/24/2024     06/24/2024     No results found for: \"INR\", \"PROTIME\"  Lab Results   Component Value Date    GLUCOSE 102 (H) 06/24/2024    CALCIUM 8.9 06/24/2024     06/24/2024    K 4.0 06/24/2024    CO2 27 06/24/2024     06/24/2024    BUN 23 06/24/2024    CREATININE 0.84 06/24/2024       Assessment and Plan:    Bilateral hand weakness -completed Solu-Medrol, oral steroid taper.  Thyromegaly -outpatient follow-up.  Tremor -subtle, treatment as above.    Discharge home today.      Sadiq Valenzuela MD  06/24/24  12:27 PM         "

## 2024-06-24 NOTE — CARE PLAN
Problem: Pain - Adult  Goal: Verbalizes/displays adequate comfort level or baseline comfort level  Outcome: Progressing     Problem: Safety - Adult  Goal: Free from fall injury  Outcome: Progressing     Problem: Discharge Planning  Goal: Discharge to home or other facility with appropriate resources  Outcome: Progressing     Problem: Chronic Conditions and Co-morbidities  Goal: Patient's chronic conditions and co-morbidity symptoms are monitored and maintained or improved  Outcome: Progressing   The patient's goals for the shift include to decrease weakness    The clinical goals for the shift include Pt will remain HDS

## 2024-06-25 LAB — ANA SER QL HEP2 SUBST: NEGATIVE

## 2024-06-25 NOTE — DISCHARGE SUMMARY
"Discharge Diagnosis  Weakness of both hands    Issues Requiring Follow-Up      Discharge Meds     Your medication list        START taking these medications        Instructions Last Dose Given Next Dose Due   cyanocobalamin 2,000 mcg tablet  Commonly known as: Vitamin B-12  Start taking on: June 25, 2024      Take 1 tablet (2,000 mcg) by mouth once daily.       predniSONE 20 mg tablet  Commonly known as: Deltasone  Start taking on: June 25, 2024      Take 3 tablets (60 mg) by mouth once daily for 2 days, THEN 2 tablets (40 mg) once daily for 1 day, THEN 1.5 tablets (30 mg) once daily for 1 day, THEN 1 tablet (20 mg) once daily for 1 day.       predniSONE 10 mg tablet  Commonly known as: Deltasone  Start taking on: June 30, 2024      Take 1 tablet (10 mg) by mouth once daily for 1 dose. Do not take before June 30, 2024.              CONTINUE taking these medications        Instructions Last Dose Given Next Dose Due   biotin 5 mg tablet           ibuprofen 200 mg tablet           loratadine 10 mg tablet  Commonly known as: Claritin           norgestimate-ethinyl estradioL 0.18/0.215/0.25 mg-35 mcg (28) tablet  Commonly known as: Ortho Tri-Cyclen,Trinessa      TAKE 1 TABLET BY MOUTH EVERY DAY AS DIRECTED                 Where to Get Your Medications        These medications were sent to [facility]  [address], [address], [place] [address]      Hours: 8 AM to 6 PM Mon-Fri, 9 AM to 1 PM Saturday Phone: [phone]   predniSONE 10 mg tablet  predniSONE 20 mg tablet       Information about where to get these medications is not yet available    Ask your nurse or doctor about these medications  cyanocobalamin 2,000 mcg tablet         Test Results Pending At Discharge  Pending Labs       Order Current Status    APOLONIA with Reflex to [facility] In process    CNS Demyelinating Disease,Serum In process            Hospital Course   Yesterday, she was heading to gym when she noticed both hands feeling a bit \"weak.\" She " went through her exercise routine with no issues. Afterward, she was showering and noticed that her B/L hand weakness (dexterity?) was worse, and she felt pressure sensation in both biceps. Her right hand was worse than left; she was having difficulty drying herself with towel in her right hand. These sensations gradually have improved; when I saw her left hand felt pretty much normal, and right hand had improved as well. She was not able to fully extend fingers 3-5 on her right hand. She also has slight tremor of right hand now, which is not her baseline. No numbness. She denies any head or neck symptoms. Feet are fine. She is otherwise fairly healthy. 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 Work-up in ED was unremarkable.      The patient was given steroids with improvement in her s/s, she was recommended outpatient follow up for her thyromegaly. She was stable for dc home with outpatient follow up.     Pertinent Physical Exam At Time of Discharge      Outpatient Follow-Up  Future Appointments   Date Time Provider Department Center   7/3/2024  9:00 AM Lorena BIRCH MD [id number] [facility]   7/9/2024  1:30 PM Ghassan Willis MD [id number]   1/30/2025  9:30 AM Nash Devi MD [id number] [facility]     Time and care for discharge management > 30 minutes.     Sadiq Valenzuela MD

## 2024-07-01 LAB
AQP4 H2O CHANNEL IGG SERPL QL: NEGATIVE
IMMUNOLOGIST REVIEW: NORMAL
MOG IGG1 SERPL QL FC: NEGATIVE

## 2024-07-03 ENCOUNTER — APPOINTMENT (OUTPATIENT)
Dept: OBSTETRICS AND GYNECOLOGY | Facility: CLINIC | Age: 44
End: 2024-07-03
Payer: COMMERCIAL

## 2024-07-03 VITALS
WEIGHT: 137 LBS | DIASTOLIC BLOOD PRESSURE: 84 MMHG | BODY MASS INDEX: 24.27 KG/M2 | SYSTOLIC BLOOD PRESSURE: 118 MMHG | HEIGHT: 63 IN

## 2024-07-03 DIAGNOSIS — Z01.419 WELL WOMAN EXAM: Primary | ICD-10-CM

## 2024-07-03 DIAGNOSIS — Z12.31 BREAST CANCER SCREENING BY MAMMOGRAM: ICD-10-CM

## 2024-07-03 DIAGNOSIS — Z30.41 ENCOUNTER FOR SURVEILLANCE OF CONTRACEPTIVE PILLS: ICD-10-CM

## 2024-07-03 PROCEDURE — 1036F TOBACCO NON-USER: CPT | Performed by: OBSTETRICS & GYNECOLOGY

## 2024-07-03 PROCEDURE — 99396 PREV VISIT EST AGE 40-64: CPT | Performed by: OBSTETRICS & GYNECOLOGY

## 2024-07-03 RX ORDER — NORGESTIMATE AND ETHINYL ESTRADIOL 7DAYSX3 28
1 KIT ORAL DAILY
Qty: 84 TABLET | Refills: 3 | Status: SHIPPED | OUTPATIENT
Start: 2024-07-03

## 2024-07-03 ASSESSMENT — ENCOUNTER SYMPTOMS
HEMATURIA: 0
APPETITE CHANGE: 0
FLANK PAIN: 0
COLOR CHANGE: 0
FREQUENCY: 0
VOMITING: 0
FATIGUE: 0
CHILLS: 0
ABDOMINAL PAIN: 0
DYSURIA: 0
BACK PAIN: 0
DIARRHEA: 0
BLOOD IN STOOL: 0
NAUSEA: 0
FEVER: 0
UNEXPECTED WEIGHT CHANGE: 0
CONSTIPATION: 0
ABDOMINAL DISTENTION: 0
SLEEP DISTURBANCE: 0
SHORTNESS OF BREATH: 0

## 2024-07-03 ASSESSMENT — PAIN SCALES - GENERAL: PAINLEVEL: 0-NO PAIN

## 2024-07-03 NOTE — PROGRESS NOTES
"Kaya Buchanan is a 44 y.o. No obstetric history on file. here for well woman exam.    Past med hx and past surg hx reviewed with patient and notable for:  recent hospitalization for b/l hand weakness - working diagnosis is possibly auto-immune and patient has follow up scheduled     Concerns: no GYN concerns     Exercise: regular exercise     GynHx:  Cycles: regular on MELISSA   Sexually active: yes with one male partner   Contraception: MELISSA  Patient's last menstrual period was 06/19/2024 (exact date).     OB History    No obstetric history on file.         Objective     Review of Systems   Constitutional:  Negative for appetite change, chills, fatigue, fever and unexpected weight change.   Respiratory:  Negative for shortness of breath.    Cardiovascular:  Negative for chest pain.   Gastrointestinal:  Negative for abdominal distention, abdominal pain, blood in stool, constipation, diarrhea, nausea and vomiting.   Endocrine: Negative for cold intolerance and heat intolerance.   Genitourinary:  Negative for dyspareunia, dysuria, flank pain, frequency, genital sores, hematuria, menstrual problem, pelvic pain, urgency, vaginal bleeding, vaginal discharge and vaginal pain.   Musculoskeletal:  Negative for back pain.   Skin:  Negative for color change.   Psychiatric/Behavioral:  Negative for sleep disturbance.        /84 (BP Location: Left arm, Patient Position: Sitting)   Ht 1.6 m (5' 3\")   Wt 62.1 kg (137 lb)   LMP 06/19/2024 (Exact Date)   BMI 24.27 kg/m²     Physical Exam  Constitutional:       Appearance: Normal appearance.   HENT:      Head: Normocephalic and atraumatic.   Chest:   Breasts:     Right: Normal.      Left: Normal.   Abdominal:      General: Abdomen is flat.      Palpations: Abdomen is soft.      Tenderness: There is no abdominal tenderness.   Genitourinary:     General: Normal vulva.      Vagina: Normal.      Cervix: Normal.      Uterus: Normal.       Adnexa: Right adnexa normal and left " adnexa normal.   Skin:     General: Skin is warm and dry.   Neurological:      Mental Status: She is alert and oriented to person, place, and time.   Psychiatric:         Mood and Affect: Mood normal.          Assessment and Plan:  Routine Well Woman Exam Today.   Discussed diet and exercise and routine health screening.   Pap: 2022 ASCUS, HPV negative ; repeat 2025  Cont with annual mammograms. Order placed, pt will schedule.   Cont MELISSA for contraception.       No orders of the defined types were placed in this encounter.

## 2024-07-09 ENCOUNTER — OFFICE VISIT (OUTPATIENT)
Dept: NEUROLOGY | Facility: HOSPITAL | Age: 44
End: 2024-07-09
Payer: COMMERCIAL

## 2024-07-09 VITALS
SYSTOLIC BLOOD PRESSURE: 133 MMHG | TEMPERATURE: 97.5 F | HEIGHT: 63 IN | RESPIRATION RATE: 18 BRPM | BODY MASS INDEX: 24.27 KG/M2 | HEART RATE: 74 BPM | DIASTOLIC BLOOD PRESSURE: 88 MMHG | WEIGHT: 137 LBS

## 2024-07-09 DIAGNOSIS — G95.9 CERVICAL MYELOPATHY (MULTI): ICD-10-CM

## 2024-07-09 PROCEDURE — 92133 CPTRZD OPH DX IMG PST SGM ON: CPT | Performed by: PSYCHIATRY & NEUROLOGY

## 2024-07-09 PROCEDURE — 1036F TOBACCO NON-USER: CPT | Performed by: PSYCHIATRY & NEUROLOGY

## 2024-07-09 PROCEDURE — 92250 FUNDUS PHOTOGRAPHY W/I&R: CPT | Performed by: PSYCHIATRY & NEUROLOGY

## 2024-07-09 PROCEDURE — 99215 OFFICE O/P EST HI 40 MIN: CPT | Mod: GC | Performed by: PSYCHIATRY & NEUROLOGY

## 2024-07-09 PROCEDURE — 99215 OFFICE O/P EST HI 40 MIN: CPT | Performed by: PSYCHIATRY & NEUROLOGY

## 2024-07-09 ASSESSMENT — VISUAL ACUITY: VA_NORMAL: 1

## 2024-07-09 ASSESSMENT — PAIN SCALES - GENERAL: PAINLEVEL: 0-NO PAIN

## 2024-07-09 NOTE — PATIENT INSTRUCTIONS
It was great meeting you today!     Your MRI findings are indicative of transverse myelitis, an inflammation of the spinal cord. Yours was a mild case and has improved since. We need to evaluate for inflammation outside the spinal cord. We looked for thinning of your optic pathway and there were some abnormal findings. Your brain MRI was normal, so you do not have a diagnosis of multiple sclerosis at this time. However, without lesions in the brain we need further investigation. Your blood work was negative for MS-mimickers, so the next step is a spinal tap. We will call you to set this up. If we see markers for MS-markers in the spinal fluid, we strongly recommend starting therapy as both your symptoms 7 years ago and last month are consistent with recurrent disease. We would also start yearly monitoring with MRI. If the spinal fluid is negative, then this is consistent with idiopathic acte transverse myelitis. Plan to follow up 3 weeks after you complete the spinal tap.     Once you have transverse myelitis, your risk of MS increases by 11%. For both brain and physical health, we recommend you stop smoking immediately. Continue your regular exercise!

## 2024-07-09 NOTE — PROGRESS NOTES
Subjective     History of Present Illness   [facility] NEURO IMMUNOLOGY HPI: Date of Onset: 7 years ago; than started back again on 6/19/2024  Date of Diagnosis: Suspected on 6/23/2024  Last MRI Brain: Normal MRI Brain on 6/21/24  Last MRI Cervical: 6/20/2024; demonstrated increased T2 signal within the bilateral gray matter of the spinal cord predominantly at the level of C6 with caudal extension up to the level of C7. There is suggestion of faint ventral gray matter enhancement in the same region at the level of upper C6. This may represent nonspecific infectious, inflammatory, or demyelinating myelitis with differential including MOGAD.    Last MRI Thoracis: Not done  Last OCT/VEP: Not done  Last CBC: Normal on 6/24/24  Cerebrospinal Fluid: Not done  Fady Jones Virus: Not done  Varicella Zoster Virus: Not done  Hep Panel: Hepatitis C negative on 1/30/2024  Neuromyelitis Optica: Negative on 6/21/24  Myelin Oligodendrocyte Glycoprotein: Negative on 6/21/24    Kaya Buchanan is a 44 y.o. right-handed female with past medical history of HELLP, alopecia, hernia repair, right upper extremity dysfunction seven years ago with residual right hand resting tremor, rare smoker (a cigar a few times a year), current intermittent alcohol use, and family history of lupus and thyroid disorders on father's side and rheumatoid arthritis on mother's side who was referred by Dr. Vin Yusuf for evaluation of suspected MOGAD in the setting of acute bilateral upper extremity symptoms.     Acute symptoms began on June 19 with brief numbness on the ulnar side of the right hand. The next day around 1700 she developed right hand numbness and weakness (inability to fully extend fingers due to weakness) that peaked to involve the left hand to a lesser degree by 2100. Denied trauma and vaccine administration within preceding 6 weeks, but reports swimming in a freshwater lake while in [place] the week prior. MRI brain was normal but MRI  cervical spine demonstrated increased T2 signal within the bilateral gray matter at the level of C6 with caudal extension to C7, concerning for infectious, inflammatory, or demyelinating myelitis including MOGAD. She was hospitalized from 6/20/24 to 6/24/2024 for IV Solumedrol. She reports feeling better by second day. B12 level was low-normal at 273, so she was started on 2000 mcg orally daily replacement. She was discharged with a prednisone taper that she completed on June 30th. Her resting tremor has been worse since discharge.     Of note, she has a history of right upper extremity nerve injury 7 years ago from when she was breastfeeding. She reports an icy sensation ran down her neck to her right arm with associated weakness. Denies involuntary movements during that time. Work-up from [facility] includes cervical spine MRI at that time (2017) is reported as showing mild to moderate bilateral foraminal stenosis at C4/5 with milder findings at other cervical levels, normal cord appearance. Brachial plexus MRI is reported as normal.  Brain MRI reported as unremarkable.  EMG reported was consistent with chronic right C8/T1 greater than C7 radiculopathy, without definite evidence of brachial plexopathy. Her weakness resolved within a year. Residual symptom of slight tremor at rest; worse if she drinks coffee or is really tired. Denies family history of multiple sclerosis.    MS Symptom Review: Weakness: 7 years ago; right hand; resolved within a year  Sensory Changes: No  Incoordination: No incoordination   Falling: No falling  Gait Change: No gait change  Painful Vision Loss: No painful vision loss  Double Vision: No double vision   Vertigo: No vertigo  Facial/Bulvar Weakness: No facial/bulvar weakness  Bladder/Bowel Dysfunction: No bladder/bowel dysfunction   Spasticity: No spasticity  Tonic Spasms: No tonic spasms  Tremors: Resting tremor intermittently for last 7 years; can control it  Restless Leg Syndrome: No  "restless leg syndrome   Dystonia: No dystonia  Other Movement Disorders: No other movement disorders  Heat Sensitivity: No heat sensitivity  Fatigue: While on prednisone  Depression: No depression   Anxiety: Occasional and well-controlled  Cognitive Changes: No cognitive changes  Disease Modifying Therapies: No Disease modifying therapies   Side Effects: Prednisone- insomnia  Is the patient taking Vitamin D supplements? No  Is the patient taking Symptomatic medications? No     Past Surgical History:   Procedure Laterality Date    HERNIA REPAIR  02/10/2015    Inguinal Hernia Repair     Social History     Tobacco Use    Smoking status: Never    Smokeless tobacco: Never   Substance Use Topics    Alcohol use: Yes     Alcohol/week: 4.0 standard drinks of alcohol     Types: 4 Glasses of wine per week     No Known Allergies  Visit Vitals  /88   Pulse 74   Temp 36.4 °C (97.5 °F)   Resp 18   Ht 1.6 m (5' 3\")   Wt 62.1 kg (137 lb)   LMP 06/19/2024 (Exact Date)   BMI 24.27 kg/m²   OB Status Having periods   Smoking Status Never   BSA 1.66 m²       Objective   Neurological Exam  Mental Status  Awake and alert. Recent and remote memory are intact. Speech is normal. Language is fluent with no aphasia. Attention and concentration are normal.    Cranial Nerves  CN II: Visual acuity is normal. Visual fields full to confrontation.  CN III, IV, VI: Extraocular movements intact bilaterally. Normal lids and orbits bilaterally. Pupils equal round and reactive to light bilaterally.  CN V: Facial sensation is normal.  CN VII: Full and symmetric facial movement.  CN VIII: Hearing is normal.  CN IX, X: Palate elevates symmetrically. Normal gag reflex.  CN XI: Shoulder shrug strength is normal.  CN XII: Tongue midline without atrophy or fasciculations.    Motor  Normal muscle bulk throughout. No fasciculations present. Normal muscle tone. The following abnormal movements were seen: Resting tremor.   Strength is 5/5 throughout all four " extremities.    Sensory  Sensation is intact to light touch, pinprick, vibration and proprioception in all four extremities.    Reflexes                                            Right                      Left  Biceps                                 2+                         2+  Patellar                                2+                         3+  Achilles                                2+                         2+    Coordination  Right: Finger-to-nose normal. Rapid alternating movement normal.    Gait  Casual gait is normal including stance, stride, and arm swing. Normal tandem gait. Romberg is absent. Able to rise from chair without using arms.    Physical Exam  Constitutional:       General: She is awake.   Eyes:      General: Lids are normal.      Extraocular Movements: Extraocular movements intact.      Pupils: Pupils are equal, round, and reactive to light.   Neurological:      Mental Status: She is alert.      Motor: Motor strength is normal.     Coordination: Romberg sign negative.      Deep Tendon Reflexes:      Reflex Scores:       Bicep reflexes are 2+ on the right side and 2+ on the left side.       Patellar reflexes are 2+ on the right side and 3+ on the left side.       Achilles reflexes are 2+ on the right side and 2+ on the left side.  Psychiatric:         Speech: Speech normal.         Procedure:  Fundus photo and OCT were performed for both eyes:  Fundus: Normal optic disc bilaterally.   RNFL: 93 on the right with slight nasal sector thinning and 90 on the left with nasal and inferior sector thinning  GCL: Normal on the right and normal the left.     Assessment/Plan   Kaya Buchanan is a 44 y.o. right-handed female with past medical history of HELLP, alopecia, hernia repair, rare smoker (a cigar a few times a year), current intermittent alcohol use, and family history of lupus and thyroid disorders on father's side and rheumatoid arthritis on mother's side who presents with  steroid-responsive BUE weakness and paraesthesia shortly after coming back from a trip in [facility]. She had an episode of transient RUE numbness and weakness with dystonic finger flexion in 2017 that started seven months after delivery and lasted for a few weeks then resolved spontaneously (no workup done back then). Brain MRI in June 2024 was normal but C-spine showed increased T2 signal within the bilateral gray matter of the spinal cord predominantly at the level of C6 without clear enhancement with a 3-level thin anterior T2 signal on sagittal cuts. Cerebrospinal fluid has not been collected. OCT demonstrated normal GCL bilaterally and normal average RNFL bilaterally but with bilateral thinning of nasal sectors especially on the left. MS Mimics of serum AQP4 and Myelin Oligodendrocyte Glycoprotein were both negative on 6/21/24.     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  Differential diagnosis includes recurrent transverse myelitis, double seronegative NMOSD, AFM, WNV myelitis, atypical MS, or Hirayama disease. Patient warrants further testing with lumbar puncture. Given two events possibly consistent with demyelinating disease and patient's autoimmune co-morbidities, if CSF is positive for MS-markers then we will consider starting DMT. If spinal fluid is negative then will consider rituximab versus careful monitoring off DMT.     Plan  - Outpatient lumbar puncture and send for cells, protein, glucose, OCBs, biofire, WNV, enterovirus; cytology, flow. will call to schedule  - nasal testing for enterovirus 68  - Follow up with Dr. Willis 3 weeks later for next steps     Problem List Items Addressed This Visit    None  Visit Diagnoses         Codes    Cervical myelopathy (Multi)     G95.9          No orders of the defined types were placed in this encounter.    Patient seen and discussed with Dr. Willis.     Dhara Cuevas MD   Pediatrics/ Child Neurology PGY3

## 2024-08-06 DIAGNOSIS — R29.898 WEAKNESS OF BOTH HANDS: Primary | ICD-10-CM

## 2024-08-10 ENCOUNTER — LAB (OUTPATIENT)
Dept: LAB | Facility: LAB | Age: 44
End: 2024-08-10
Payer: COMMERCIAL

## 2024-08-10 DIAGNOSIS — R29.898 WEAKNESS OF BOTH HANDS: ICD-10-CM

## 2024-08-10 LAB
APTT PPP: 26 SECONDS (ref 27–38)
BASOPHILS # BLD AUTO: 0.05 X10*3/UL (ref 0–0.1)
BASOPHILS NFR BLD AUTO: 1.5 %
EOSINOPHIL # BLD AUTO: 0.14 X10*3/UL (ref 0–0.7)
EOSINOPHIL NFR BLD AUTO: 4.1 %
ERYTHROCYTE [DISTWIDTH] IN BLOOD BY AUTOMATED COUNT: 13.2 % (ref 11.5–14.5)
HCT VFR BLD AUTO: 43.3 % (ref 36–46)
HGB BLD-MCNC: 13.7 G/DL (ref 12–16)
IMM GRANULOCYTES # BLD AUTO: 0 X10*3/UL (ref 0–0.7)
IMM GRANULOCYTES NFR BLD AUTO: 0 % (ref 0–0.9)
INR PPP: 0.9 (ref 0.9–1.1)
LYMPHOCYTES # BLD AUTO: 1.09 X10*3/UL (ref 1.2–4.8)
LYMPHOCYTES NFR BLD AUTO: 31.9 %
MCH RBC QN AUTO: 28 PG (ref 26–34)
MCHC RBC AUTO-ENTMCNC: 31.6 G/DL (ref 32–36)
MCV RBC AUTO: 89 FL (ref 80–100)
MONOCYTES # BLD AUTO: 0.3 X10*3/UL (ref 0.1–1)
MONOCYTES NFR BLD AUTO: 8.8 %
NEUTROPHILS # BLD AUTO: 1.84 X10*3/UL (ref 1.2–7.7)
NEUTROPHILS NFR BLD AUTO: 53.7 %
NRBC BLD-RTO: 0 /100 WBCS (ref 0–0)
PLATELET # BLD AUTO: 146 X10*3/UL (ref 150–450)
PROTHROMBIN TIME: 10.5 SECONDS (ref 9.8–12.8)
RBC # BLD AUTO: 4.89 X10*6/UL (ref 4–5.2)
WBC # BLD AUTO: 3.4 X10*3/UL (ref 4.4–11.3)

## 2024-08-10 PROCEDURE — 85730 THROMBOPLASTIN TIME PARTIAL: CPT

## 2024-08-10 PROCEDURE — 36415 COLL VENOUS BLD VENIPUNCTURE: CPT

## 2024-08-10 PROCEDURE — 85610 PROTHROMBIN TIME: CPT

## 2024-08-10 PROCEDURE — 85025 COMPLETE CBC W/AUTO DIFF WBC: CPT

## 2024-08-13 ENCOUNTER — PROCEDURE VISIT (OUTPATIENT)
Dept: NEUROLOGY | Facility: HOSPITAL | Age: 44
End: 2024-08-13
Payer: COMMERCIAL

## 2024-08-13 DIAGNOSIS — G95.9 CERVICAL MYELOPATHY (MULTI): Primary | ICD-10-CM

## 2024-08-13 LAB
APPEARANCE CSF: CLEAR
BASOPHILS NFR CSF MANUAL: 0 %
BLASTS CSF MANUAL: 0 %
COLOR CSF: COLORLESS
COLOR SPUN CSF: COLORLESS
EOSINOPHIL NFR CSF MANUAL: 0 %
GLUCOSE CSF-MCNC: 62 MG/DL (ref 40–70)
IMM GRANULOCYTES NFR CSF: 0 %
LYMPHOCYTES NFR CSF MANUAL: 100 % (ref 28–96)
MONOS+MACROS NFR CSF MANUAL: 0 % (ref 16–56)
NEUTS SEG NFR CSF MANUAL: 0 % (ref 0–5)
OTHER CELLS NFR CSF MANUAL: 0 %
PLASMA CELLS NFR CSF MICRO: 0 %
PROT CSF-MCNC: 20 MG/DL (ref 15–45)
RBC # CSF AUTO: 16 /UL (ref 0–5)
TOTAL CELLS COUNTED CSF: 28
TUBE # CSF: ABNORMAL
WBC # CSF AUTO: 3 /UL (ref 1–5)

## 2024-08-13 PROCEDURE — 89050 BODY FLUID CELL COUNT: CPT | Performed by: PSYCHIATRY & NEUROLOGY

## 2024-08-13 PROCEDURE — 83873 ASSAY OF CSF PROTEIN: CPT | Performed by: PSYCHIATRY & NEUROLOGY

## 2024-08-13 PROCEDURE — 87798 DETECT AGENT NOS DNA AMP: CPT | Performed by: PSYCHIATRY & NEUROLOGY

## 2024-08-13 PROCEDURE — 62270 DX LMBR SPI PNXR: CPT | Performed by: PSYCHIATRY & NEUROLOGY

## 2024-08-13 PROCEDURE — 87799 DETECT AGENT NOS DNA QUANT: CPT | Performed by: PSYCHIATRY & NEUROLOGY

## 2024-08-13 PROCEDURE — 82164 ANGIOTENSIN I ENZYME TEST: CPT | Performed by: PSYCHIATRY & NEUROLOGY

## 2024-08-13 PROCEDURE — 87205 SMEAR GRAM STAIN: CPT | Performed by: PSYCHIATRY & NEUROLOGY

## 2024-08-13 PROCEDURE — 82945 GLUCOSE OTHER FLUID: CPT | Performed by: PSYCHIATRY & NEUROLOGY

## 2024-08-13 PROCEDURE — 86618 LYME DISEASE ANTIBODY: CPT | Performed by: PSYCHIATRY & NEUROLOGY

## 2024-08-13 PROCEDURE — 88185 FLOWCYTOMETRY/TC ADD-ON: CPT | Mod: TC | Performed by: PSYCHIATRY & NEUROLOGY

## 2024-08-13 NOTE — PROGRESS NOTES
HPI:  Ms. Buchanan is a 44 y.o. right-handed woman, who is a patient of Dr. Willis with past medical history of HELLP, alopecia, hernia repair, rare smoker, current intermittent alcohol use, and family history of lupus and thyroid disorders on father's side and rheumatoid arthritis on mother's side who presents for lumbar puncture as a part of diagnostic work up for cervical myelopathy.     I personally reviewed CBC and coagulation screen prior to procedure.     Informed Consent:    Prior to the start of the procedure a time out was taken and the following were verified: the identity of the patient using two patient identifiers (name, ) - this was verified by the patient (family member if patient cannot provide), and the provider.    The risks, benefits, and alternatives of lumbar puncture were discussed in detail with the patient. Risks that were discussed include but not limited to bleeding, infection, pain and headache. All of the patient's questions were answered. Verbal and written informed consent were given for this procedure.    Procedure:  A solution of 1% lidocaine (5 ml) was used to numb the region. Using landmarks, a 20-gauge spinal needle was inserted in the L3-L4 interspace and advanced into the subarachnoid space on 1 attempt. The stylet was removed. Method of CSF collection: drip only.    A total of 10 CC of clear CSF was collected.      The procedure was well tolerated, patient felt well after it, no complaint of headache.

## 2024-08-14 ENCOUNTER — TELEPHONE (OUTPATIENT)
Dept: NEUROLOGY | Facility: CLINIC | Age: 44
End: 2024-08-14
Payer: COMMERCIAL

## 2024-08-14 LAB
LABORATORY COMMENT REPORT: NORMAL
LABORATORY COMMENT REPORT: NORMAL
PATH REPORT.FINAL DX SPEC: NORMAL
PATH REPORT.GROSS SPEC: NORMAL
PATH REPORT.RELEVANT HX SPEC: NORMAL
PATH REPORT.TOTAL CANCER: NORMAL
RESIDENT REVIEW: NORMAL

## 2024-08-14 NOTE — TELEPHONE ENCOUNTER
Kori with  Lab services called with a cancelled Lab.  She stated the meningitis lab was cancelled due to there being no abnormality to support Meningitis.

## 2024-08-15 LAB
CELL POPULATIONS: NORMAL
DIAGNOSIS: NORMAL
FLOW DIFFERENTIAL: NORMAL
FLOW TEST ORDERED: NORMAL
LAB TEST METHOD: NORMAL
NUMBER OF CELLS COLLECTED: NORMAL
PATH REPORT.TOTAL CANCER: NORMAL
SIGNATURE COMMENT: NORMAL
SPECIMEN VIABILITY: NORMAL

## 2024-08-16 LAB
ACE CSF-CCNC: 1.2 U/L (ref 0–2.5)
BACTERIA CSF CULT: NORMAL
ENTEROVIRUS,RNA PCR, QUANTITATIVE (NON-BLOOD/NON-CSF SPECIMEN): NOT DETECTED COPIES/ML
GRAM STN SPEC: NORMAL
GRAM STN SPEC: NORMAL
MBP CSF-MCNC: 1.49 NG/ML (ref 0–5.5)

## 2024-08-17 LAB — LYME ANTIBODY, CSF: 0.3 IV

## 2024-08-19 LAB
WNV IGG CSF IA-ACNC: 0.06 IV
WNV IGM CSF IA-ACNC: 0 IV

## 2024-08-20 LAB
BACTERIA CSF CULT: NORMAL
GRAM STN SPEC: NORMAL
GRAM STN SPEC: NORMAL

## 2024-08-29 ENCOUNTER — HOSPITAL ENCOUNTER (OUTPATIENT)
Dept: RADIOLOGY | Facility: CLINIC | Age: 44
Discharge: HOME | End: 2024-08-29
Payer: COMMERCIAL

## 2024-08-29 VITALS — WEIGHT: 135 LBS | BODY MASS INDEX: 23.92 KG/M2 | HEIGHT: 63 IN

## 2024-08-29 DIAGNOSIS — Z12.31 BREAST CANCER SCREENING BY MAMMOGRAM: ICD-10-CM

## 2024-08-29 PROCEDURE — 77063 BREAST TOMOSYNTHESIS BI: CPT | Performed by: RADIOLOGY

## 2024-08-29 PROCEDURE — 77067 SCR MAMMO BI INCL CAD: CPT

## 2024-08-29 PROCEDURE — 77067 SCR MAMMO BI INCL CAD: CPT | Performed by: RADIOLOGY

## 2024-08-30 DIAGNOSIS — G95.9 CERVICAL MYELOPATHY (MULTI): Primary | ICD-10-CM

## 2024-08-31 ENCOUNTER — LAB (OUTPATIENT)
Dept: LAB | Facility: LAB | Age: 44
End: 2024-08-31
Payer: COMMERCIAL

## 2024-08-31 DIAGNOSIS — G95.9 CERVICAL MYELOPATHY (MULTI): ICD-10-CM

## 2024-11-04 ENCOUNTER — OFFICE VISIT (OUTPATIENT)
Dept: NEUROLOGY | Facility: HOSPITAL | Age: 44
End: 2024-11-04
Payer: COMMERCIAL

## 2024-11-04 VITALS
RESPIRATION RATE: 18 BRPM | WEIGHT: 140 LBS | BODY MASS INDEX: 25.76 KG/M2 | DIASTOLIC BLOOD PRESSURE: 91 MMHG | HEIGHT: 62 IN | SYSTOLIC BLOOD PRESSURE: 143 MMHG | HEART RATE: 84 BPM | TEMPERATURE: 97.3 F

## 2024-11-04 DIAGNOSIS — R29.898 RIGHT ARM WEAKNESS: Primary | ICD-10-CM

## 2024-11-04 DIAGNOSIS — G95.9 CERVICAL MYELOPATHY: ICD-10-CM

## 2024-11-04 PROCEDURE — 3008F BODY MASS INDEX DOCD: CPT | Performed by: PSYCHIATRY & NEUROLOGY

## 2024-11-04 PROCEDURE — 1036F TOBACCO NON-USER: CPT | Performed by: PSYCHIATRY & NEUROLOGY

## 2024-11-04 PROCEDURE — 99214 OFFICE O/P EST MOD 30 MIN: CPT | Performed by: PSYCHIATRY & NEUROLOGY

## 2024-11-04 RX ORDER — ERGOCALCIFEROL 1.25 MG/1
1.25 CAPSULE ORAL
Qty: 4 CAPSULE | Refills: 11 | Status: SHIPPED | OUTPATIENT
Start: 2024-11-10 | End: 2025-11-10

## 2024-11-04 ASSESSMENT — VISUAL ACUITY: VA_NORMAL: 1

## 2024-11-04 ASSESSMENT — PAIN SCALES - GENERAL: PAINLEVEL_OUTOF10: 0-NO PAIN

## 2024-11-04 NOTE — PROGRESS NOTES
Subjective     History of Present Illness   [facility] NEURO IMMUNOLOGY HPI: Date of Onset: 7 years ago; than started back again on 6/19/2024  Date of Diagnosis: Suspected on 6/23/2024  Last MRI Brain: Normal MRI Brain on 6/21/24  Last MRI Cervical: 6/20/2024; demonstrated increased T2 signal within the bilateral gray matter of the spinal cord predominantly at the level of C6 with caudal extension up to the level of C7. There is suggestion of faint ventral gray matter enhancement in the same region at the level of upper C6. This may represent nonspecific infectious, inflammatory, or demyelinating myelitis with differential including MOGAD.    Last MRI Thoracis: Not done  Last OCT/VEP: Not done  Cerebrospinal Fluid: W 3. P normal, OCBS not reported  Fady Jones Virus: Not done  Varicella Zoster Virus: Not done  Hep Panel: Hepatitis C negative on 1/30/2024  Neuromyelitis Optica: Negative on 6/21/24  Myelin Oligodendrocyte Glycoprotein: Negative on 6/21/24    Kaya Buchanan is a 44 y.o. right-handed female with past medical history of HELLP, alopecia, hernia repair, right upper extremity dysfunction seven years ago with residual right hand resting tremor, rare smoker (a cigar a few times a year), current intermittent alcohol use, and family history of lupus and thyroid disorders on father's side and rheumatoid arthritis on mother's side who presents for follow up.     Of note, she has a history of right upper extremity nerve injury 7 years ago from when she was breastfeeding. She reports an icy sensation ran down her neck to her right arm with associated weakness. Denies involuntary movements during that time. Work-up from [facility] includes cervical spine MRI at that time (2017) is reported as showing mild to moderate bilateral foraminal stenosis at C4/5 with milder findings at other cervical levels, normal cord appearance. Brachial plexus MRI is reported as normal.  Brain MRI reported as unremarkable.  EMG reported was  "consistent with chronic right C8/T1 greater than C7 radiculopathy, without definite evidence of brachial plexopathy. Her weakness resolved within a year. Residual symptom of slight tremor at rest; worse if she drinks coffee or is really tired. Denies family history of multiple sclerosis.    MS Symptom Review: Weakness: 7 years ago; right hand; resolved within a year  Sensory Changes: No  Incoordination: No incoordination   Falling: No falling  Gait Change: No gait change  Painful Vision Loss: No painful vision loss  Double Vision: No double vision   Vertigo: No vertigo  Facial/Bulvar Weakness: No facial/bulvar weakness  Bladder/Bowel Dysfunction: No bladder/bowel dysfunction   Spasticity: No spasticity  Tonic Spasms: No tonic spasms  Tremors: Resting tremor intermittently for last 7 years; can control it  Restless Leg Syndrome: No restless leg syndrome   Dystonia: No dystonia  Other Movement Disorders: No other movement disorders  Heat Sensitivity: No heat sensitivity  Fatigue: While on prednisone  Depression: No depression   Anxiety: Occasional and well-controlled  Cognitive Changes: No cognitive changes  Disease Modifying Therapies: No Disease modifying therapies   Side Effects: Prednisone- insomnia  Is the patient taking Vitamin D supplements? No  Is the patient taking Symptomatic medications? No     Past Surgical History:   Procedure Laterality Date    HERNIA REPAIR  02/10/2015    Inguinal Hernia Repair     Social History     Tobacco Use    Smoking status: Never    Smokeless tobacco: Never   Substance Use Topics    Alcohol use: Yes     Alcohol/week: 4.0 standard drinks of alcohol     Types: 4 Glasses of wine per week     No Known Allergies  Visit Vitals  BP (!) 143/91   Pulse 84   Temp 36.3 °C (97.3 °F)   Resp 18   Ht 1.575 m (5' 2\")   Wt 63.5 kg (140 lb)   BMI 25.61 kg/m²   OB Status Having periods   Smoking Status Never   BSA 1.67 m²       Objective   Neurological Exam  Mental Status  Awake and alert. Recent " and remote memory are intact. Speech is normal. Language is fluent with no aphasia. Attention and concentration are normal.    Cranial Nerves  CN II: Visual acuity is normal. Visual fields full to confrontation.  CN III, IV, VI: Extraocular movements intact bilaterally. Normal lids and orbits bilaterally. Pupils equal round and reactive to light bilaterally.  CN V: Facial sensation is normal.  CN VII: Full and symmetric facial movement.  CN VIII: Hearing is normal.  CN IX, X: Palate elevates symmetrically. Normal gag reflex.  CN XI: Shoulder shrug strength is normal.  CN XII: Tongue midline without atrophy or fasciculations.    Motor  Normal muscle bulk throughout. No fasciculations present. Normal muscle tone. The following abnormal movements were seen: Resting tremor.   Strength is 5/5 throughout all four extremities.    Sensory  Sensation is intact to light touch, pinprick, vibration and proprioception in all four extremities.    Reflexes                                            Right                      Left  Biceps                                 2+                         2+  Patellar                                2+                         3+  Achilles                                2+                         2+    Coordination  Right: Finger-to-nose normal. Rapid alternating movement normal.    Gait  Casual gait is normal including stance, stride, and arm swing. Normal tandem gait. Romberg is absent. Able to rise from chair without using arms.    Physical Exam  Constitutional:       General: She is awake.   Eyes:      General: Lids are normal.      Extraocular Movements: Extraocular movements intact.      Pupils: Pupils are equal, round, and reactive to light.   Neurological:      Mental Status: She is alert.      Motor: Motor strength is normal.     Coordination: Romberg sign negative.      Deep Tendon Reflexes:      Reflex Scores:       Bicep reflexes are 2+ on the right side and 2+ on the left side.        Patellar reflexes are 2+ on the right side and 3+ on the left side.       Achilles reflexes are 2+ on the right side and 2+ on the left side.  Psychiatric:         Speech: Speech normal.         Procedure:  Fundus photo and OCT were performed for both eyes:  Fundus: Normal optic disc bilaterally.   RNFL: 93 on the right with slight nasal sector thinning and 90 on the left with nasal and inferior sector thinning  GCL: Normal on the right and normal the left.     Assessment/Plan   Kaya Buchanan is a 44 y.o. right-handed female with past medical history of HELLP, alopecia, hernia repair, rare smoker (a cigar a few times a year), current intermittent alcohol use, and family history of lupus and thyroid disorders on father's side and rheumatoid arthritis on mother's side who presents with steroid-responsive BUE weakness and paraesthesia shortly after coming back from a trip in [place]. Brain MRI in June 2024 was normal but C-spine showed increased T2 signal within the bilateral gray matter of the spinal cord predominantly at the level of C6 without clear enhancement with a 3-level thin anterior T2 signal on sagittal cuts. She had an episode of transient RUE numbness and weakness with dystonic finger flexion in 2017 that started seven months after delivery and lasted for a year then resolved spontaneously. Cervical spine MRI at that time (2017) reported as showing mild to moderate bilateral foraminal stenosis at C4/5 with milder findings at other cervical levels, normal cord appearance. Brachial plexus MRI reported as normal.  Brain MRI reported as unremarkable.  EMG reported was consistent with chronic right C8/T1 greater than C7 radiculopathy, without definite evidence of brachial plexopathy.  Cerebrospinal fluid done in August of 2024 was bland (OCBs not reported despite being ordered). Viral studies were negative. Nasal enterovirus PCR was negative. OCT demonstrated normal GCL bilaterally and normal average  RNFL bilaterally but with bilateral thinning of nasal sectors especially on the left. MS Mimics of serum AQP4 and Myelin Oligodendrocyte Glycoprotein were both negative on 6/21/24.     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  Differential diagnosis includes idiopathic transverse myelitis (recurrent? The attack from 2017 could have been MRI-negative myelitis), double seronegative NMOSD, Hirayama disease, or atypical MS. We discussed rituximab versus careful monitoring off DMT, and we mutually agreed to go with the latter.     Plan  - Discussion: The diagnosis of TM was discussed with the patient and family in detail. All questions answered and reading material provided.  - Acute relapse treatment: not indicated  - DMT: will monitor her off DMT for now but will have a very low threshold to start DMT in case of any clinical or radiological activity.   - Symptomatic: none  - MRI: Will order next brain MRI to be done in December. Will also order dynamic cervical MRI to be done at the same time to evaluate for Hirayama disease.   - Blood work: may need repeat MOG and AQP4 in the future.   - Will prescribe vitamin D 27892 units weekly.   - PT/OT: not indicated  - Smoking: non smoker  - Wellness: discussed low salt and healthy dieting.   - Follow up: after MRI in January or February of 2025.     The impression and plan were discussed with the patient and family (if present) in details and all questions answered.              Problem List Items Addressed This Visit             ICD-10-CM    Right arm weakness - Primary R29.898    Relevant Medications    ergocalciferol (Vitamin D-2) 1.25 MG (55438 UT) capsule (Start on 11/10/2024)    Other Relevant Orders    MR brain w and wo IV contrast    MR cervical spine w and wo IV contrast     Other Visit Diagnoses         Codes    Cervical myelopathy     G95.9    Relevant Medications    ergocalciferol (Vitamin D-2) 1.25 MG (76973 UT) capsule  (Start on 11/10/2024)    Other Relevant Orders    MR brain w and wo IV contrast    MR cervical spine w and wo IV contrast            Orders Placed This Encounter   Procedures    MR brain w and wo IV contrast     Standing Status:   Future     Standing Expiration Date:   11/4/2025     Order Specific Question:   Reason for exam:     Answer:   rule out MS     Order Specific Question:   Does the patient have a Cochlear Implant, Pacemaker, Defibrillator, Pacing Wire, Brain Aneurysm Clip, Implanted Nerve or Bone Graft Stimulator, Implanted Breast Tissue Expander, Glucose Monitor or Neulasta Device?     Answer:   No     Order Specific Question:   Radiologist to Determine Optimal Study     Answer:   Yes     Order Specific Question:   Release result to [employer]     Answer:   Immediate     Order Specific Question:   Is this exam part of a Research Study? If Yes, link this order to the research study     Answer:   No    MR cervical spine w and wo IV contrast     Standing Status:   Future     Standing Expiration Date:   11/4/2025     Order Specific Question:   Reason for exam:     Answer:   dynamic cervical study (flexion-extension) to evaluate for Heriyama disease     Order Specific Question:   Does the patient have a Cochlear Implant, Pacemaker, Defibrillator, Pacing Wire, Brain Aneurysm Clip, Implanted Nerve or Bone Graft Stimulator, Implanted Breast Tissue Expander, Glucose Monitor or Neulasta Device?     Answer:   No     Order Specific Question:   Radiologist to Determine Optimal Study     Answer:   Yes     Order Specific Question:   Release result to [employer]     Answer:   Immediate     Order Specific Question:   Is this exam part of a Research Study? If Yes, link this order to the research study     Answer:   No         Ghassan Willis MD

## 2024-11-04 NOTE — PATIENT INSTRUCTIONS
Your spinal fluid was normal and negative for all infectious and inflammatory markers.     At this point, we can monitor you with periodic brain MRIs without immunosuppressive medications.     Will prescribe vitamin D 99862 units weekly. This can reduce the chances of you having MS in the future.     Repeat brain Mri in December then follow up again with me in January or February. I will also order a dynamic cervical MRI to be done at that time.     Please let me know if you have any new neurological or visual symptoms in the future.     Thank you for visiting the clinic today    Ghassan Willis MD

## 2024-12-02 ENCOUNTER — APPOINTMENT (OUTPATIENT)
Dept: RADIOLOGY | Facility: HOSPITAL | Age: 44
End: 2024-12-02
Payer: COMMERCIAL

## 2024-12-02 ENCOUNTER — HOSPITAL ENCOUNTER (OUTPATIENT)
Dept: RADIOLOGY | Facility: HOSPITAL | Age: 44
Discharge: HOME | End: 2024-12-02
Payer: COMMERCIAL

## 2024-12-02 DIAGNOSIS — R29.898 RIGHT ARM WEAKNESS: ICD-10-CM

## 2024-12-02 DIAGNOSIS — G95.9 CERVICAL MYELOPATHY: ICD-10-CM

## 2024-12-02 PROCEDURE — 70553 MRI BRAIN STEM W/O & W/DYE: CPT | Performed by: RADIOLOGY

## 2024-12-02 PROCEDURE — 2550000001 HC RX 255 CONTRASTS: Performed by: PSYCHIATRY & NEUROLOGY

## 2024-12-02 PROCEDURE — 72156 MRI NECK SPINE W/O & W/DYE: CPT

## 2024-12-02 PROCEDURE — A9575 INJ GADOTERATE MEGLUMI 0.1ML: HCPCS | Performed by: PSYCHIATRY & NEUROLOGY

## 2024-12-02 PROCEDURE — 70553 MRI BRAIN STEM W/O & W/DYE: CPT

## 2024-12-02 RX ORDER — GADOTERATE MEGLUMINE 376.9 MG/ML
12 INJECTION INTRAVENOUS
Status: COMPLETED | OUTPATIENT
Start: 2024-12-02 | End: 2024-12-02

## 2025-01-30 ENCOUNTER — APPOINTMENT (OUTPATIENT)
Dept: PRIMARY CARE | Facility: CLINIC | Age: 45
End: 2025-01-30
Payer: COMMERCIAL

## 2025-01-30 VITALS
OXYGEN SATURATION: 100 % | WEIGHT: 146 LBS | RESPIRATION RATE: 18 BRPM | DIASTOLIC BLOOD PRESSURE: 70 MMHG | HEIGHT: 62 IN | SYSTOLIC BLOOD PRESSURE: 108 MMHG | HEART RATE: 61 BPM | TEMPERATURE: 97.1 F | BODY MASS INDEX: 26.87 KG/M2

## 2025-01-30 DIAGNOSIS — E55.9 HYPOVITAMINOSIS D: ICD-10-CM

## 2025-01-30 DIAGNOSIS — Z00.00 HEALTHCARE MAINTENANCE: Primary | ICD-10-CM

## 2025-01-30 DIAGNOSIS — Z12.31 BREAST CANCER SCREENING BY MAMMOGRAM: ICD-10-CM

## 2025-01-30 DIAGNOSIS — K42.9 UMBILICAL HERNIA WITHOUT OBSTRUCTION OR GANGRENE: ICD-10-CM

## 2025-01-30 PROCEDURE — 1036F TOBACCO NON-USER: CPT | Performed by: INTERNAL MEDICINE

## 2025-01-30 PROCEDURE — 3008F BODY MASS INDEX DOCD: CPT | Performed by: INTERNAL MEDICINE

## 2025-01-30 PROCEDURE — 99396 PREV VISIT EST AGE 40-64: CPT | Performed by: INTERNAL MEDICINE

## 2025-01-30 PROCEDURE — 99213 OFFICE O/P EST LOW 20 MIN: CPT | Performed by: INTERNAL MEDICINE

## 2025-01-30 ASSESSMENT — ENCOUNTER SYMPTOMS
SINUS PRESSURE: 0
CHEST TIGHTNESS: 0
CHILLS: 0
DIARRHEA: 0
SHORTNESS OF BREATH: 0
AGITATION: 0
MYALGIAS: 0
PALPITATIONS: 0
NAUSEA: 0
ACTIVITY CHANGE: 0
SORE THROAT: 0
WEAKNESS: 0
DEPRESSION: 0

## 2025-01-30 ASSESSMENT — PAIN SCALES - GENERAL: PAINLEVEL_OUTOF10: 0-NO PAIN

## 2025-01-30 NOTE — PROGRESS NOTES
"Subjective   Patient ID: Kaya Buchanan is a 44 y.o. female who presents for Annual Exam.  She had an illness from a visit to [place]. She feels that she has recovered.     HPI     Review of Systems   Constitutional:  Negative for activity change and chills.   HENT:  Negative for congestion, sinus pressure and sore throat.    Respiratory:  Negative for chest tightness and shortness of breath.    Cardiovascular:  Negative for chest pain and palpitations.   Gastrointestinal:  Negative for diarrhea and nausea.   Musculoskeletal:  Negative for myalgias.   Neurological:  Negative for weakness.   Psychiatric/Behavioral:  Negative for agitation and behavioral problems.        Objective   /70 (BP Location: Left arm, Patient Position: Sitting, BP Cuff Size: Adult)   Pulse 61   Temp 36.2 °C (97.1 °F) (Temporal)   Resp 18   Ht 1.575 m (5' 2\")   Wt 66.2 kg (146 lb)   SpO2 100%   BMI 26.70 kg/m²     Physical Exam  Constitutional:       Appearance: Normal appearance.   HENT:      Head: Normocephalic and atraumatic.      Nose: Nose normal.      Mouth/Throat:      Mouth: Mucous membranes are moist.   Eyes:      Extraocular Movements: Extraocular movements intact.   Cardiovascular:      Rate and Rhythm: Normal rate and regular rhythm.   Pulmonary:      Effort: No respiratory distress.      Breath sounds: No wheezing.   Abdominal:      General: Bowel sounds are normal.      Palpations: Abdomen is soft.      Hernia: A hernia is present.      Comments: Umbilical hernia   Neurological:      General: No focal deficit present.         Assessment/Plan   Assessment & Plan  Healthcare maintenance  Doing well overall.   Orders:    Comprehensive Metabolic Panel; Future    Lipid Panel; Future    Hypovitaminosis D  She is on replacement  Orders:    Vitamin D 25-Hydroxy,Total (for eval of Vitamin D levels); Future    Breast cancer screening by mammogram  Up to date.       Umbilical hernia without obstruction or gangrene  It is " small and she will reach out if it worsens and I will refer to surgery.

## 2025-02-01 LAB
25(OH)D3+25(OH)D2 SERPL-MCNC: 100 NG/ML (ref 30–100)
ALBUMIN SERPL-MCNC: 4.3 G/DL (ref 3.6–5.1)
ALP SERPL-CCNC: 51 U/L (ref 31–125)
ALT SERPL-CCNC: 10 U/L (ref 6–29)
ANION GAP SERPL CALCULATED.4IONS-SCNC: 13 MMOL/L (CALC) (ref 7–17)
AST SERPL-CCNC: 16 U/L (ref 10–30)
BILIRUB SERPL-MCNC: 1.1 MG/DL (ref 0.2–1.2)
BUN SERPL-MCNC: 14 MG/DL (ref 7–25)
CALCIUM SERPL-MCNC: 9.4 MG/DL (ref 8.6–10.2)
CHLORIDE SERPL-SCNC: 102 MMOL/L (ref 98–110)
CHOLEST SERPL-MCNC: 214 MG/DL
CHOLEST/HDLC SERPL: 2.4 (CALC)
CO2 SERPL-SCNC: 24 MMOL/L (ref 20–32)
CREAT SERPL-MCNC: 0.96 MG/DL (ref 0.5–0.99)
EGFRCR SERPLBLD CKD-EPI 2021: 76 ML/MIN/1.73M2
GLUCOSE SERPL-MCNC: 73 MG/DL (ref 65–99)
HDLC SERPL-MCNC: 89 MG/DL
LDLC SERPL CALC-MCNC: 110 MG/DL (CALC)
NONHDLC SERPL-MCNC: 125 MG/DL (CALC)
POTASSIUM SERPL-SCNC: 4.6 MMOL/L (ref 3.5–5.3)
PROT SERPL-MCNC: 7.2 G/DL (ref 6.1–8.1)
SODIUM SERPL-SCNC: 139 MMOL/L (ref 135–146)
TRIGL SERPL-MCNC: 68 MG/DL

## 2025-02-03 DIAGNOSIS — Z83.49 FAMILY HISTORY OF THYROID DISEASE: Primary | ICD-10-CM

## 2025-02-04 ENCOUNTER — OFFICE VISIT (OUTPATIENT)
Dept: NEUROLOGY | Facility: HOSPITAL | Age: 45
End: 2025-02-04
Payer: COMMERCIAL

## 2025-02-04 VITALS
SYSTOLIC BLOOD PRESSURE: 146 MMHG | DIASTOLIC BLOOD PRESSURE: 105 MMHG | WEIGHT: 146 LBS | HEART RATE: 84 BPM | BODY MASS INDEX: 25.87 KG/M2 | RESPIRATION RATE: 18 BRPM | TEMPERATURE: 97.1 F | HEIGHT: 63 IN

## 2025-02-04 DIAGNOSIS — R29.898 RIGHT ARM WEAKNESS: ICD-10-CM

## 2025-02-04 DIAGNOSIS — G95.9 CERVICAL MYELOPATHY: ICD-10-CM

## 2025-02-04 PROCEDURE — 3008F BODY MASS INDEX DOCD: CPT | Performed by: PSYCHIATRY & NEUROLOGY

## 2025-02-04 PROCEDURE — 99213 OFFICE O/P EST LOW 20 MIN: CPT | Performed by: PSYCHIATRY & NEUROLOGY

## 2025-02-04 RX ORDER — ERGOCALCIFEROL 1.25 MG/1
1.25 CAPSULE ORAL
Qty: 4 CAPSULE | Refills: 11 | Status: SHIPPED | OUTPATIENT
Start: 2025-02-09 | End: 2026-02-09

## 2025-02-04 NOTE — PATIENT INSTRUCTIONS
Your last brain MRI was normal. Your cervical MRI showed stable lesion.    At this point, we can continue to monitor you with periodic brain MRIs without immunosuppressive medications.     Continue vitamin D 76866 units weekly. Your level is now good and we should keep it this way. This can reduce the chances of you having MS in the future.     Please avoid smoking nicotine products.     Repeat brain Mri in December then follow up again with me.      Please let me know if you have any new neurological or visual symptoms in the future.     Thank you for visiting the clinic today    Ghassan Willis MD

## 2025-02-05 ASSESSMENT — VISUAL ACUITY: VA_NORMAL: 1

## 2025-02-05 NOTE — PROGRESS NOTES
Subjective     History of Present Illness   [facility] NEURO IMMUNOLOGY HPI:   Date of Onset: 2018; than started back again on 6/19/2024  Date of Diagnosis: Suspected on 6/2/2024  Last MRI Brain: 12/24  Last MRI Cervical: 12/2024;   Last MRI Thoracis: Not done  Last OCT/VEP: Not done  Cerebrospinal Fluid: W 3. P normal, OCBS not reported  Fady Jones Virus: Not done  Varicella Zoster Virus: Not done  Hep Panel: Hepatitis C negative on 1/30/2024  Neuromyelitis Optica: Negative on 6/21/24  Myelin Oligodendrocyte Glycoprotein: Negative on 6/21/24    Kaya Buchanan is a 44 y.o. right-handed female with past medical history of HELLP, alopecia, hernia repair, right upper extremity dysfunction seven years ago with residual right hand resting tremor, rare smoker (a cigar a few times a year), current intermittent alcohol use, and family history of lupus and thyroid disorders on father's side and rheumatoid arthritis on mother's side who presents for follow up.     Interval hx:  No new symptoms. Brain MRI normal. Cervical MRI shows stable no lesions (dynamic study not performed).     MS Symptom Review: Weakness: 7 years ago; right hand; resolved within a year  Sensory Changes: No  Incoordination: No incoordination   Falling: No falling  Gait Change: No gait change  Painful Vision Loss: No painful vision loss  Double Vision: No double vision   Vertigo: No vertigo  Facial/Bulvar Weakness: No facial/bulvar weakness  Bladder/Bowel Dysfunction: No bladder/bowel dysfunction   Spasticity: No spasticity  Tonic Spasms: No tonic spasms  Tremors: Resting tremor intermittently for last 7 years; can control it  Restless Leg Syndrome: No restless leg syndrome   Dystonia: No dystonia  Other Movement Disorders: No other movement disorders  Heat Sensitivity: No heat sensitivity  Fatigue: While on prednisone  Depression: No depression   Anxiety: Occasional and well-controlled  Cognitive Changes: No cognitive changes  Disease Modifying  "Therapies: No Disease modifying therapies   Side Effects: Prednisone- insomnia  Is the patient taking Vitamin D supplements? No  Is the patient taking Symptomatic medications? No     Past Surgical History:   Procedure Laterality Date    HERNIA REPAIR  02/10/2015    Inguinal Hernia Repair     Social History     Tobacco Use    Smoking status: Never    Smokeless tobacco: Never   Substance Use Topics    Alcohol use: Yes     Alcohol/week: 4.0 standard drinks of alcohol     Types: 4 Glasses of wine per week     No Known Allergies  Visit Vitals  BP (!) 146/105   Pulse 84   Temp 36.2 °C (97.1 °F)   Resp 18   Ht 1.6 m (5' 3\")   Wt 66.2 kg (146 lb)   BMI 25.86 kg/m²   OB Status Having periods   Smoking Status Never   BSA 1.72 m²       Objective   Neurological Exam  Mental Status  Awake and alert. Recent and remote memory are intact. Speech is normal. Language is fluent with no aphasia. Attention and concentration are normal.    Cranial Nerves  CN II: Visual acuity is normal. Visual fields full to confrontation.  CN III, IV, VI: Extraocular movements intact bilaterally. Normal lids and orbits bilaterally. Pupils equal round and reactive to light bilaterally.  CN V: Facial sensation is normal.  CN VII: Full and symmetric facial movement.  CN VIII: Hearing is normal.  CN IX, X: Palate elevates symmetrically. Normal gag reflex.  CN XI: Shoulder shrug strength is normal.  CN XII: Tongue midline without atrophy or fasciculations.    Motor  Normal muscle bulk throughout. No fasciculations present. Normal muscle tone. The following abnormal movements were seen: Resting tremor.   Strength is 5/5 throughout all four extremities.    Sensory  Sensation is intact to light touch, pinprick, vibration and proprioception in all four extremities.    Reflexes                                            Right                      Left  Biceps                                 2+                         2+  Patellar                                2+ "                         3+  Achilles                                2+                         2+    Coordination  Right: Finger-to-nose normal. Rapid alternating movement normal.    Gait  Casual gait is normal including stance, stride, and arm swing. Normal tandem gait. Romberg is absent. Able to rise from chair without using arms.    Physical Exam  Constitutional:       General: She is awake.   Eyes:      General: Lids are normal.      Extraocular Movements: Extraocular movements intact.      Pupils: Pupils are equal, round, and reactive to light.   Neurological:      Mental Status: She is alert.      Motor: Motor strength is normal.     Coordination: Romberg sign negative.      Deep Tendon Reflexes:      Reflex Scores:       Bicep reflexes are 2+ on the right side and 2+ on the left side.       Patellar reflexes are 2+ on the right side and 3+ on the left side.       Achilles reflexes are 2+ on the right side and 2+ on the left side.  Psychiatric:         Speech: Speech normal.             Assessment/Plan   Kaya Buchanan is a 44 y.o. right-handed female with past medical history of HELLP, alopecia, hernia repair, rare smoker (a cigar a few times a year), current intermittent alcohol use, and family history of lupus and thyroid disorders on father's side and rheumatoid arthritis on mother's side who presents with steroid-responsive BUE weakness and paraesthesia shortly after coming back from a trip in [facility]. Brain MRI in June 2024 was normal but C-spine showed increased T2 signal within the bilateral gray matter of the spinal cord predominantly at the level of C6 without clear enhancement with a 3-level thin anterior T2 signal on sagittal cuts. She had an episode of transient RUE numbness and weakness with dystonic finger flexion in 2017 that started seven months after delivery and lasted for a year then resolved spontaneously. Cervical spine MRI at that time (2017) reported as showing mild to moderate  bilateral foraminal stenosis at C4/5 with milder findings at other cervical levels, normal cord appearance. Brachial plexus MRI reported as normal.  Brain MRI reported as unremarkable.  EMG reported was consistent with chronic right C8/T1 greater than C7 radiculopathy, without definite evidence of brachial plexopathy.  Cerebrospinal fluid done in August of 2024 was bland (OCBs not reported despite being ordered). Viral studies were negative. Nasal enterovirus PCR was negative. OCT demonstrated normal GCL bilaterally and normal average RNFL bilaterally but with bilateral thinning of nasal sectors especially on the left. MS Mimics of serum AQP4 and Myelin Oligodendrocyte Glycoprotein were both negative on 6/21/24.     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  Differential diagnosis includes idiopathic transverse myelitis (recurrent? The attack from 2017 could have been MRI-negative myelitis), double seronegative NMOSD, Hirayama disease, or atypical MS. We discussed rituximab versus careful monitoring off DMT, and we mutually agreed to go with the latter.     2/5/2025: No new symptoms. Brain MRI normal. Cervical MRI shows stable no lesions (dynamic study not performed). Will continue to monitor her off DMT with yearly brain MRI.     Plan  - Discussion: The diagnosis of TM was discussed with the patient and family in detail. All questions answered and reading material provided.  - Acute relapse treatment: not indicated  - DMT: will monitor her off DMT for now but will have a very low threshold to start DMT in case of any clinical or radiological activity.   - Symptomatic: none  - MRI: Will order next brain MRI to be done in December.   - Blood work: may need repeat MOG and AQP4 in the future.   - Will prescribe vitamin D 92466 units weekly.   - PT/OT: not indicated  - Smoking: non smoker  - Wellness: discussed low salt and healthy dieting.   - Follow up: after MRI in  December    The impression and plan were discussed with the patient and family (if present) in details and all questions answered.     Ghassan Willis MD               Problem List Items Addressed This Visit             ICD-10-CM    Right arm weakness R29.898    Relevant Medications    ergocalciferol (Vitamin D-2) 1.25 MG (96828 UT) capsule (Start on 2/9/2025)     Other Visit Diagnoses         Codes    Cervical myelopathy     G95.9    Relevant Medications    ergocalciferol (Vitamin D-2) 1.25 MG (41190 UT) capsule (Start on 2/9/2025)            No orders of the defined types were placed in this encounter.        Ghassan Willis MD      1.96

## 2025-02-18 DIAGNOSIS — G95.9 CERVICAL MYELOPATHY: Primary | ICD-10-CM

## 2025-02-18 DIAGNOSIS — R29.898 WEAKNESS OF BOTH HANDS: ICD-10-CM

## 2025-03-16 LAB — TSH SERPL-ACNC: 0.9 MIU/L

## 2025-07-07 ENCOUNTER — APPOINTMENT (OUTPATIENT)
Dept: OBSTETRICS AND GYNECOLOGY | Facility: CLINIC | Age: 45
End: 2025-07-07
Payer: COMMERCIAL

## 2025-07-10 DIAGNOSIS — Z30.41 ENCOUNTER FOR SURVEILLANCE OF CONTRACEPTIVE PILLS: ICD-10-CM

## 2025-07-10 RX ORDER — NORGESTIMATE AND ETHINYL ESTRADIOL 7DAYSX3 28
1 KIT ORAL DAILY
Qty: 84 TABLET | Refills: 3 | Status: SHIPPED | OUTPATIENT
Start: 2025-07-10

## 2025-07-11 RX ORDER — NORGESTIMATE AND ETHINYL ESTRADIOL 7DAYSX3 28
1 KIT ORAL DAILY
Qty: 84 TABLET | Refills: 3 | OUTPATIENT
Start: 2025-07-11

## 2025-07-14 ENCOUNTER — APPOINTMENT (OUTPATIENT)
Dept: OBSTETRICS AND GYNECOLOGY | Facility: CLINIC | Age: 45
End: 2025-07-14
Payer: COMMERCIAL

## 2025-09-23 ENCOUNTER — APPOINTMENT (OUTPATIENT)
Facility: CLINIC | Age: 45
End: 2025-09-23
Payer: COMMERCIAL

## 2026-02-05 ENCOUNTER — APPOINTMENT (OUTPATIENT)
Dept: PRIMARY CARE | Facility: CLINIC | Age: 46
End: 2026-02-05
Payer: COMMERCIAL